# Patient Record
Sex: MALE | Race: WHITE | NOT HISPANIC OR LATINO | URBAN - METROPOLITAN AREA
[De-identification: names, ages, dates, MRNs, and addresses within clinical notes are randomized per-mention and may not be internally consistent; named-entity substitution may affect disease eponyms.]

---

## 2019-01-08 ENCOUNTER — INPATIENT (INPATIENT)
Facility: HOSPITAL | Age: 59
LOS: 1 days | Discharge: HOME | End: 2019-01-10
Attending: INTERNAL MEDICINE | Admitting: INTERNAL MEDICINE

## 2019-01-08 VITALS
OXYGEN SATURATION: 97 % | SYSTOLIC BLOOD PRESSURE: 232 MMHG | TEMPERATURE: 97 F | RESPIRATION RATE: 18 BRPM | DIASTOLIC BLOOD PRESSURE: 122 MMHG | HEART RATE: 96 BPM

## 2019-01-08 LAB
ALBUMIN SERPL ELPH-MCNC: 4.6 G/DL — SIGNIFICANT CHANGE UP (ref 3.5–5.2)
ALP SERPL-CCNC: 62 U/L — SIGNIFICANT CHANGE UP (ref 30–115)
ALT FLD-CCNC: 39 U/L — SIGNIFICANT CHANGE UP (ref 0–41)
ANION GAP SERPL CALC-SCNC: 15 MMOL/L — HIGH (ref 7–14)
AST SERPL-CCNC: 71 U/L — HIGH (ref 0–41)
BASOPHILS # BLD AUTO: 0.08 K/UL — SIGNIFICANT CHANGE UP (ref 0–0.2)
BASOPHILS NFR BLD AUTO: 0.8 % — SIGNIFICANT CHANGE UP (ref 0–1)
BILIRUB SERPL-MCNC: 0.3 MG/DL — SIGNIFICANT CHANGE UP (ref 0.2–1.2)
BUN SERPL-MCNC: 19 MG/DL — SIGNIFICANT CHANGE UP (ref 10–20)
CALCIUM SERPL-MCNC: 9.6 MG/DL — SIGNIFICANT CHANGE UP (ref 8.5–10.1)
CHLORIDE SERPL-SCNC: 100 MMOL/L — SIGNIFICANT CHANGE UP (ref 98–110)
CO2 SERPL-SCNC: 30 MMOL/L — SIGNIFICANT CHANGE UP (ref 17–32)
CREAT SERPL-MCNC: 1.1 MG/DL — SIGNIFICANT CHANGE UP (ref 0.7–1.5)
EOSINOPHIL # BLD AUTO: 0.59 K/UL — SIGNIFICANT CHANGE UP (ref 0–0.7)
EOSINOPHIL NFR BLD AUTO: 6.2 % — SIGNIFICANT CHANGE UP (ref 0–8)
GLUCOSE SERPL-MCNC: 86 MG/DL — SIGNIFICANT CHANGE UP (ref 70–99)
HCT VFR BLD CALC: 44 % — SIGNIFICANT CHANGE UP (ref 42–52)
HGB BLD-MCNC: 14.9 G/DL — SIGNIFICANT CHANGE UP (ref 14–18)
IMM GRANULOCYTES NFR BLD AUTO: 0.2 % — SIGNIFICANT CHANGE UP (ref 0.1–0.3)
LYMPHOCYTES # BLD AUTO: 3.3 K/UL — SIGNIFICANT CHANGE UP (ref 1.2–3.4)
LYMPHOCYTES # BLD AUTO: 34.6 % — SIGNIFICANT CHANGE UP (ref 20.5–51.1)
MCHC RBC-ENTMCNC: 29.7 PG — SIGNIFICANT CHANGE UP (ref 27–31)
MCHC RBC-ENTMCNC: 33.9 G/DL — SIGNIFICANT CHANGE UP (ref 32–37)
MCV RBC AUTO: 87.6 FL — SIGNIFICANT CHANGE UP (ref 80–94)
MONOCYTES # BLD AUTO: 0.82 K/UL — HIGH (ref 0.1–0.6)
MONOCYTES NFR BLD AUTO: 8.6 % — SIGNIFICANT CHANGE UP (ref 1.7–9.3)
NEUTROPHILS # BLD AUTO: 4.72 K/UL — SIGNIFICANT CHANGE UP (ref 1.4–6.5)
NEUTROPHILS NFR BLD AUTO: 49.6 % — SIGNIFICANT CHANGE UP (ref 42.2–75.2)
NRBC # BLD: 0 /100 WBCS — SIGNIFICANT CHANGE UP (ref 0–0)
PLATELET # BLD AUTO: 243 K/UL — SIGNIFICANT CHANGE UP (ref 130–400)
POTASSIUM SERPL-MCNC: 4.3 MMOL/L — SIGNIFICANT CHANGE UP (ref 3.5–5)
POTASSIUM SERPL-SCNC: 4.3 MMOL/L — SIGNIFICANT CHANGE UP (ref 3.5–5)
PROT SERPL-MCNC: 7.2 G/DL — SIGNIFICANT CHANGE UP (ref 6–8)
RBC # BLD: 5.02 M/UL — SIGNIFICANT CHANGE UP (ref 4.7–6.1)
RBC # FLD: 12.7 % — SIGNIFICANT CHANGE UP (ref 11.5–14.5)
SODIUM SERPL-SCNC: 145 MMOL/L — SIGNIFICANT CHANGE UP (ref 135–146)
TROPONIN T SERPL-MCNC: <0.01 NG/ML — SIGNIFICANT CHANGE UP
WBC # BLD: 9.53 K/UL — SIGNIFICANT CHANGE UP (ref 4.8–10.8)
WBC # FLD AUTO: 9.53 K/UL — SIGNIFICANT CHANGE UP (ref 4.8–10.8)

## 2019-01-08 RX ORDER — METOPROLOL TARTRATE 50 MG
25 TABLET ORAL
Qty: 0 | Refills: 0 | Status: DISCONTINUED | OUTPATIENT
Start: 2019-01-08 | End: 2019-01-10

## 2019-01-08 RX ORDER — PANTOPRAZOLE SODIUM 20 MG/1
40 TABLET, DELAYED RELEASE ORAL
Qty: 0 | Refills: 0 | Status: DISCONTINUED | OUTPATIENT
Start: 2019-01-08 | End: 2019-01-10

## 2019-01-08 RX ORDER — CHLORHEXIDINE GLUCONATE 213 G/1000ML
1 SOLUTION TOPICAL
Qty: 0 | Refills: 0 | Status: DISCONTINUED | OUTPATIENT
Start: 2019-01-08 | End: 2019-01-10

## 2019-01-08 RX ORDER — HEPARIN SODIUM 5000 [USP'U]/ML
5000 INJECTION INTRAVENOUS; SUBCUTANEOUS EVERY 8 HOURS
Qty: 0 | Refills: 0 | Status: DISCONTINUED | OUTPATIENT
Start: 2019-01-08 | End: 2019-01-09

## 2019-01-08 RX ORDER — LABETALOL HCL 100 MG
10 TABLET ORAL ONCE
Qty: 0 | Refills: 0 | Status: COMPLETED | OUTPATIENT
Start: 2019-01-08 | End: 2019-01-08

## 2019-01-08 RX ORDER — MECLIZINE HCL 12.5 MG
25 TABLET ORAL ONCE
Qty: 0 | Refills: 0 | Status: COMPLETED | OUTPATIENT
Start: 2019-01-08 | End: 2019-01-08

## 2019-01-08 RX ORDER — LABETALOL HCL 100 MG
20 TABLET ORAL ONCE
Qty: 0 | Refills: 0 | Status: COMPLETED | OUTPATIENT
Start: 2019-01-08 | End: 2019-01-08

## 2019-01-08 RX ORDER — SODIUM CHLORIDE 9 MG/ML
1000 INJECTION INTRAMUSCULAR; INTRAVENOUS; SUBCUTANEOUS ONCE
Qty: 0 | Refills: 0 | Status: COMPLETED | OUTPATIENT
Start: 2019-01-08 | End: 2019-01-08

## 2019-01-08 RX ORDER — AMLODIPINE BESYLATE 2.5 MG/1
5 TABLET ORAL ONCE
Qty: 0 | Refills: 0 | Status: COMPLETED | OUTPATIENT
Start: 2019-01-08 | End: 2019-01-08

## 2019-01-08 RX ORDER — AMLODIPINE BESYLATE 2.5 MG/1
5 TABLET ORAL DAILY
Qty: 0 | Refills: 0 | Status: DISCONTINUED | OUTPATIENT
Start: 2019-01-08 | End: 2019-01-09

## 2019-01-08 RX ORDER — ASPIRIN/CALCIUM CARB/MAGNESIUM 324 MG
81 TABLET ORAL DAILY
Qty: 0 | Refills: 0 | Status: DISCONTINUED | OUTPATIENT
Start: 2019-01-08 | End: 2019-01-10

## 2019-01-08 RX ORDER — ATORVASTATIN CALCIUM 80 MG/1
80 TABLET, FILM COATED ORAL AT BEDTIME
Qty: 0 | Refills: 0 | Status: DISCONTINUED | OUTPATIENT
Start: 2019-01-08 | End: 2019-01-10

## 2019-01-08 RX ADMIN — Medication 10 MILLIGRAM(S): at 18:25

## 2019-01-08 RX ADMIN — AMLODIPINE BESYLATE 5 MILLIGRAM(S): 2.5 TABLET ORAL at 15:56

## 2019-01-08 RX ADMIN — ATORVASTATIN CALCIUM 80 MILLIGRAM(S): 80 TABLET, FILM COATED ORAL at 23:22

## 2019-01-08 RX ADMIN — Medication 25 MILLIGRAM(S): at 15:56

## 2019-01-08 RX ADMIN — Medication 20 MILLIGRAM(S): at 22:28

## 2019-01-08 RX ADMIN — HEPARIN SODIUM 5000 UNIT(S): 5000 INJECTION INTRAVENOUS; SUBCUTANEOUS at 23:23

## 2019-01-08 RX ADMIN — SODIUM CHLORIDE 2000 MILLILITER(S): 9 INJECTION INTRAMUSCULAR; INTRAVENOUS; SUBCUTANEOUS at 15:09

## 2019-01-08 NOTE — ED PROVIDER NOTE - NS ED ROS FT
Except as documented in HPI, all other ROS negative.   GENERAL: Denies fever/chills, loss of appetite/weight or fatigue.  SKIN: Denies rashes, abrasions, lacerations, ecchymosis, erythema, or edema.  HEAD: + dizziness.  EYES: Denies blurry vision, diplopia, or photophobia.  ENT: Denies earaches, discharge or hearing loss. Denies nasal discharge or epistaxis. Denies sore throat.   CARDIAC: Denies chest pain, palpitations, or SOB.   RESPIRATORY: Denies SOB, cough, hemoptysis or wheezing.   GI: Denies abdominal pain, n/v/d.   MSK: Denies myalgias, bony deformity or pain.   NEURO: Denies paresthesias, tingling, weakness.

## 2019-01-08 NOTE — H&P ADULT - NSHPPHYSICALEXAM_GEN_ALL_CORE
PHYSICAL EXAM:  GEN: No acute distress  HEENT: NCAT. Left-kenny saccadic motion of the eyes, EOMI/PERRLA  LUNGS: Clear to auscultation bilaterally   HEART: S1/S2 present. RRR.   ABD: Soft, non-tender, non-distended. Bowel sounds present  EXT: No pitting edema. 5/5 muscle strength in all extremities   NEURO: AAOX3

## 2019-01-08 NOTE — ED ADULT NURSE NOTE - CHPI ED NUR SYMPTOMS NEG
no change in level of consciousness/no blurred vision/no vomiting/no loss of consciousness/no nausea/no numbness/no confusion

## 2019-01-08 NOTE — ED PROVIDER NOTE - PROGRESS NOTE DETAILS
Initial BP noted - pt aware and states he does not know if he has high BP as he has not been to a doctor and has not had his BP checked and does not take meds. Will get labs and ct head and recheck BP. Spoke w/ neuro NP - recommending admission to medicine, with neuro consult & f/u, likely to get MRI. States she is leaving now at 7pm, now overnight coverage, recommending putting page through system for neuro attending. BP still elevated, will give another dose of Labetalol and admit. Spoke w/ Dr. Krishnan from neuro. Recommending admission, okay with tele, for MRI's and neuro eval. Recommending keeping systolic around 180's, as to not drop It too fast. BP was initially still >200/100 after 10 of Labetalol, but came down to 188/88 before giving the 20 of Labetalol. Will hold the 20 of Labetalol for now. Spoke to MAR, aware of pt for admission.

## 2019-01-08 NOTE — H&P ADULT - HISTORY OF PRESENT ILLNESS
57 yo M with PMHx of Meniere's disease (L ear, diagnosed 20 years ago) with residual L-sided hearing loss and constant tinnitus, presents with acute onset of vertigo, nausea/vomiting x 3 days. Symptoms began on Saturday 7 pm, while he was watching a football game, had drank 3 glasses of wine at the time. He felt everything began to spin in large motions, improved with eye closure, associated with intense nausea, dry heaves and multiple episodes of vomiting,, sweating, ringing in the ear. He was able to go to sleep. The following day, the nausea had improved slightly but he remained unsteady of his feet and he was able to tolerate small meals. He went to Urgent Care on Monday where they noted he had high blood pressure and he was sent to the hospital. He denies chest pain, palpitations, blurry vision, paresthesia/weakness. On admission he was /122, HR 90s. He was given Amlodipine 5 mg, Labetalol IV 10 mg x 1. NS 1 L x 1.     He admits he is a binge-drinker. Drinks approximately 3-5 glasses of wine per occasion, 4-5 times a month. Never smoked. He has taken Amoxicillin prescribed before by physicians. Penicillin allergy was noted as an infant. He has no recollection.     His father has passed away at 38 yo from MI. He had uncontrolled hypertension. His mom passed away at 69 from metastatic peritoneal mesothelioma. Father's family has long QT syndrome.

## 2019-01-08 NOTE — H&P ADULT - FAMILY HISTORY
Father  Still living? No  Family history of long QT syndrome, Age at diagnosis: Age Unknown  Family history of heart attack, Age at diagnosis: Age Unknown     Mother  Still living? Unknown  Family history of cancer, Age at diagnosis: Age Unknown

## 2019-01-08 NOTE — ED PROVIDER NOTE - OBJECTIVE STATEMENT
Pt is a 57 y/o Male, unknown PMHX as pt has not been to a doctor in years, presents to ED w/ dizziness and vomiting. Pt reports symptoms began 3 days ago, he vomited several times at start of symptoms, but has not had vomiting since. Also reports having blurry vision at start of symptoms. Pt reports dizziness has gradually been getting better, but he still does not feel 100% and feels worse with ambulating. Pt denies nausea at this time. Denies fever, chills, headache, vision changes, chest pain, SOB, abdominal pain, weakness, numbness, tingling, falls. Denies earaches.

## 2019-01-08 NOTE — ED ADULT NURSE NOTE - OBJECTIVE STATEMENT
Pt a&ox3, pt p/w c/o feeling off balance. Pt states symptoms began Saturday, pt felt as if room was spinning like when he had vertigo in the past, pt also had episode of n/v and fever/chills. Pt states he feels better since Saturday just off balance but n/v, fever/chills resolved. Pt went to  yesterday and B/P was high, pt does not see a cardiologist, B/P high upon arrival. Pt denies CP/SOB or palpitations, denies n/v/d, denies changes in vision, denies HA, denies abd pain.

## 2019-01-08 NOTE — ED PROVIDER NOTE - PHYSICAL EXAMINATION
VITAL SIGNS: I have reviewed the initial vital signs.   CONSTITUTIONAL: Awake, alert. Well-developed; well-nourished; in no distress. Non-toxic appearing.   SKIN: No rash, vesicles/lesion, abrasions or lacerations. No ecchymosis or signs of trauma.   HEAD: Normocephalic; atraumatic.   EYES: Symmetrical, no discharge or signs of trauma. Conjunctiva and sclera clear. PERRL, EOM intact with no nystagmus or pain.   ENT: Airway patent. MMM.   NECK: Supple; non-tender.  CARD: No chest wall deformity or tenderness. S1, S2 normal; no murmurs, gallops, or rubs. Regular rate and rhythm.  RESP: Good air movement. Lungs CTAB. No crackles, wheezes, rales or rhonchi.  ABD: Soft; non-distended; non-tender.   EXT: No bony deformity or tenderness. Normal ROM x 4 extremities.   NEURO: A&Ox3. GCS 15. Normal speech. CN 2-12 intact. Strength 5/5 UE/LE b/l. No sensory deficits. n/v intact UE/LE b/l, pulses symmetrical. Normal finger to nose exam. Negative Romberg. + slightly wobbly gait. VITAL SIGNS: I have reviewed the initial vital signs.   CONSTITUTIONAL: Awake, alert. Well-developed; well-nourished; in no distress. Non-toxic appearing.   SKIN: No rash, vesicles/lesion, abrasions or lacerations. No ecchymosis or signs of trauma.   HEAD: Normocephalic; atraumatic.   EYES: Symmetrical, no discharge or signs of trauma. Conjunctiva and sclera clear. PERRL, EOM intact with nystagmus to left.   ENT: Airway patent. MMM.   NECK: Supple; non-tender.  CARD: No chest wall deformity or tenderness. S1, S2 normal; no murmurs, gallops, or rubs. Regular rate and rhythm.  RESP: Good air movement. Lungs CTAB. No crackles, wheezes, rales or rhonchi.  ABD: Soft; non-distended; non-tender.   EXT: No bony deformity or tenderness. Normal ROM x 4 extremities.   NEURO: A&Ox3. GCS 15. Normal speech. CN 2-12 intact. Strength 5/5 UE/LE b/l. No sensory deficits. n/v intact UE/LE b/l, pulses symmetrical. Normal finger to nose exam. Negative Romberg. + slightly wobbly gait.

## 2019-01-08 NOTE — ED ADULT NURSE NOTE - INTERVENTIONS DEFINITIONS
Call bell, personal items and telephone within reach/Monitor gait and stability/Physically safe environment: no spills, clutter or unnecessary equipment/[place] to call system/Instruct patient to call for assistance/Stretcher in lowest position, wheels locked, appropriate side rails in place/Monitor for mental status changes and reorient to person, place, and time/Reinforce activity limits and safety measures with patient and family

## 2019-01-08 NOTE — H&P ADULT - NSHPLABSRESULTS_GEN_ALL_CORE
LABS:                        14.9   9.53  )-----------( 243      ( 08 Jan 2019 15:00 )             44.0     01-08    145  |  100  |  19  ----------------------------<  86  4.3   |  30  |  1.1    Ca    9.6      08 Jan 2019 15:00    TPro  7.2  /  Alb  4.6  /  TBili  0.3  /  DBili  x   /  AST  71<H>  /  ALT  39  /  AlkPhos  62  01-08          Troponin T, Serum: <0.01 ng/mL (01-08-19 @ 15:00)      CARDIAC MARKERS ( 08 Jan 2019 15:00 )  x     / <0.01 ng/mL / x     / x     / x          < from: CT Head No Cont (01.08.19 @ 17:02) >    NoCT evidence for acute intracranial pathology.    < end of copied text >

## 2019-01-08 NOTE — ED ADULT NURSE REASSESSMENT NOTE - NS ED NURSE REASSESS COMMENT FT1
Handoff report given by previous nurse. Patient assessed aox4, hypertensive asymptomatic. Pt denies; chest pain, headache, diaphoresis, dizziness at this time. Pt bp 188/89 lizzette Ortez notified, hold labetalol 20 mg dose as per neurology recommendations not to drop BP too fast. Systolic bp goal 180. Pt placed on cardiac monitor hr  79 in NSR, plan of care reviewed with patient.

## 2019-01-08 NOTE — H&P ADULT - ASSESSMENT
59 yo M with PMHx of Meniere's disease (L ear, diagnosed 20 years ago) with residual L-sided hearing loss and constant tinnitus, presents with acute onset of vertigo, nausea/vomiting x 3 days.     #) Vertigo, leftward saccadic motion of the eyes, possibly 2/2 hypertensive emergency, hx of Meniere's disease   - Follow-up Neurology  - Lower BP to 160-180 mmHg in the first 24 hrs  - Maintain on Telemetry for 24 hrs  - Neurochecks Q4H  - Await MRI Head     #) Prolonged QT, family history of prolonged QT syndrome  - Follow-up 2D Echo  - Follow-up Mg    #) Healthcare Maintenance  - Follow-up A1c, Lipid, TSH    #) GI ppx: PO Protonix 40 mg QD  #) DVT ppx: HSQ    #) Activity: Ambulate as tolerated, fall risk  #) Diet: Low sodium    #) Dispo: Home  #) Full Code 59 yo M with PMHx of Meniere's disease (L ear, diagnosed 20 years ago) with residual L-sided hearing loss and constant tinnitus, presents with acute onset of vertigo, nausea/vomiting x 3 days.     #) Acute onset vertigo, leftward saccadic motion of the eyes, possibly 2/2 hypertensive emergency, hx of Meniere's disease   - Follow-up Neurology  - Lower BP to 160-180 mmHg in the first 24 hrs  - Maintain on Telemetry for 24 hrs  - Neurochecks Q4H  - Await MRI Head   - Start on Amlodipine 5 mg, Metoprolol 25 mg BID  - Aspirin 81 mg, Atorvastatin 80 mg QHS    #) Prolonged QT, family history of prolonged QT syndrome  - Follow-up 2D Echo  - Follow-up Mg    #) Healthcare Maintenance  - Follow-up A1c, Lipid, TSH    #) GI ppx: PO Protonix 40 mg QD  #) DVT ppx: HSQ    #) Activity: Ambulate as tolerated, fall risk  #) Diet: Low sodium    #) Dispo: Home  #) Full Code

## 2019-01-08 NOTE — ED PROVIDER NOTE - MEDICAL DECISION MAKING DETAILS
57 yo male with dizziness and elevated blood pressure,  CT head negative, labs WNL, ECG without ischemic changes.  Louie consulted admit for BP control and further work up.

## 2019-01-08 NOTE — ED PROVIDER NOTE - ATTENDING CONTRIBUTION TO CARE
57 yo male h/o left ear hearing loss for decades c/o intermittent positions dizziness described as a spinning sensation for a few days.  Abrupt onset after turning his head associated with intense nausea and a few episodes of NBNB emesis.  Symptoms have improved now, but mild dizziness persists, seen in urgent care center yesterday, had elevated BP , has not seen doctor in years since evaluation of hearing loss.  Denies headache, neck pain, blurry or double vision, no 57 yo male h/o left ear hearing loss for decades c/o intermittent positions dizziness described as a spinning sensation for a few days.  Abrupt onset after turning his head associated with intense nausea and a few episodes of NBNB emesis.  Symptoms have improved now, but mild dizziness persists, seen in urgent care center yesterday, had elevated BP , has not seen doctor in years since evaluation of hearing loss.  Denies headache, neck pain, blurry or double vision, no CP, SOB, leg pain or swelling, no abdominal or flank pain.  Exam as noted.  Will check labs, Ct head, , Meclizine, reassess.

## 2019-01-08 NOTE — H&P ADULT - ATTENDING COMMENTS
Patient seen and examined independently. Agree with resident note/ history / physical exam and plan of care with following exceptions/additions/updates. Case discussed with house-staff, nursing and patient/pt decision maker. spoke with wife at the bedside.     At this point he has slight headache. no n/v. no visual disturbance.   has chronic [place] on the left.     Vital Signs Last 24 Hrs  T(C): 37.1 (09 Jan 2019 07:21), Max: 37.1 (09 Jan 2019 07:21)  T(F): 98.7 (09 Jan 2019 07:21), Max: 98.7 (09 Jan 2019 07:21)  HR: 73 (09 Jan 2019 07:21) (70 - 96)  BP: 183/88 (09 Jan 2019 10:42) (167/77 - 232/122)  RR: 20 (09 Jan 2019 07:21) (18 - 20)  SpO2: 99% (09 Jan 2019 07:21) (96% - 100%)    Physical exam:   constitutional NAD, AAOX3, Respiratory  lungs CTA, CVS heart RRR, GI: abdomen Soft NT, ND, BS+, skin: intact  neuro exam non focal.                           15.0   9.72  )-----------( 193      ( 09 Jan 2019 08:00 )             44.6     01-09    144  |  101  |  17  ----------------------------<  107<H>  3.8   |  27  |  0.9    Ca    9.1      09 Jan 2019 08:00  Mg     2.0     01-09    TPro  6.9  /  Alb  4.2  /  TBili  0.3  /  DBili  <0.2  /  AST  86<H>  /  ALT  44<H>  /  AlkPhos  61  01-09    < from: CT Head No Cont (01.08.19 @ 17:02) >    NoCT evidence for acute intracranial pathology.    < from: 12 Lead ECG (01.08.19 @ 14:32) >    QTC Calculation(Bezet) 506 ms    Normal sinus rhythm  Right bundle branch block  Abnormal ECG    < end of copied text >    a/p      1- Hypertension , uncontrolled. hydralazine IV given, increase amlodipine to 10, add HCTZ, cont metoprolol.     2- prolonged QT, check Mg, keep level around 2. EP eval    3- dizziness, hx of meniere disease and hearing loss ( left ear) outpt fu.     4- dvt prophylaxis    Full code.

## 2019-01-09 DIAGNOSIS — I45.81 LONG QT SYNDROME: ICD-10-CM

## 2019-01-09 DIAGNOSIS — R42 DIZZINESS AND GIDDINESS: ICD-10-CM

## 2019-01-09 DIAGNOSIS — I10 ESSENTIAL (PRIMARY) HYPERTENSION: ICD-10-CM

## 2019-01-09 LAB
ALBUMIN SERPL ELPH-MCNC: 4.2 G/DL — SIGNIFICANT CHANGE UP (ref 3.5–5.2)
ALP SERPL-CCNC: 61 U/L — SIGNIFICANT CHANGE UP (ref 30–115)
ALT FLD-CCNC: 44 U/L — HIGH (ref 0–41)
ANION GAP SERPL CALC-SCNC: 16 MMOL/L — HIGH (ref 7–14)
APTT BLD: 31.6 SEC — SIGNIFICANT CHANGE UP (ref 27–39.2)
AST SERPL-CCNC: 86 U/L — HIGH (ref 0–41)
BASOPHILS # BLD AUTO: 0.09 K/UL — SIGNIFICANT CHANGE UP (ref 0–0.2)
BASOPHILS NFR BLD AUTO: 0.9 % — SIGNIFICANT CHANGE UP (ref 0–1)
BILIRUB DIRECT SERPL-MCNC: <0.2 MG/DL — SIGNIFICANT CHANGE UP (ref 0–0.2)
BILIRUB INDIRECT FLD-MCNC: >0.1 MG/DL — LOW (ref 0.2–1.2)
BILIRUB SERPL-MCNC: 0.3 MG/DL — SIGNIFICANT CHANGE UP (ref 0.2–1.2)
BUN SERPL-MCNC: 17 MG/DL — SIGNIFICANT CHANGE UP (ref 10–20)
CALCIUM SERPL-MCNC: 9.1 MG/DL — SIGNIFICANT CHANGE UP (ref 8.5–10.1)
CHLORIDE SERPL-SCNC: 101 MMOL/L — SIGNIFICANT CHANGE UP (ref 98–110)
CHOLEST SERPL-MCNC: 147 MG/DL — SIGNIFICANT CHANGE UP (ref 100–200)
CO2 SERPL-SCNC: 27 MMOL/L — SIGNIFICANT CHANGE UP (ref 17–32)
CREAT SERPL-MCNC: 0.9 MG/DL — SIGNIFICANT CHANGE UP (ref 0.7–1.5)
EOSINOPHIL # BLD AUTO: 0.56 K/UL — SIGNIFICANT CHANGE UP (ref 0–0.7)
EOSINOPHIL NFR BLD AUTO: 5.8 % — SIGNIFICANT CHANGE UP (ref 0–8)
ESTIMATED AVERAGE GLUCOSE: 123 MG/DL — HIGH (ref 68–114)
GLUCOSE SERPL-MCNC: 107 MG/DL — HIGH (ref 70–99)
HBA1C BLD-MCNC: 5.9 % — HIGH (ref 4–5.6)
HCT VFR BLD CALC: 44.6 % — SIGNIFICANT CHANGE UP (ref 42–52)
HDLC SERPL-MCNC: 47 MG/DL — SIGNIFICANT CHANGE UP
HGB BLD-MCNC: 15 G/DL — SIGNIFICANT CHANGE UP (ref 14–18)
IMM GRANULOCYTES NFR BLD AUTO: 0.2 % — SIGNIFICANT CHANGE UP (ref 0.1–0.3)
INR BLD: 1.04 RATIO — SIGNIFICANT CHANGE UP (ref 0.65–1.3)
LIPID PNL WITH DIRECT LDL SERPL: 90 MG/DL — SIGNIFICANT CHANGE UP (ref 4–129)
LYMPHOCYTES # BLD AUTO: 2.74 K/UL — SIGNIFICANT CHANGE UP (ref 1.2–3.4)
LYMPHOCYTES # BLD AUTO: 28.2 % — SIGNIFICANT CHANGE UP (ref 20.5–51.1)
MAGNESIUM SERPL-MCNC: 2 MG/DL — SIGNIFICANT CHANGE UP (ref 1.8–2.4)
MCHC RBC-ENTMCNC: 29.4 PG — SIGNIFICANT CHANGE UP (ref 27–31)
MCHC RBC-ENTMCNC: 33.6 G/DL — SIGNIFICANT CHANGE UP (ref 32–37)
MCV RBC AUTO: 87.3 FL — SIGNIFICANT CHANGE UP (ref 80–94)
MONOCYTES # BLD AUTO: 0.67 K/UL — HIGH (ref 0.1–0.6)
MONOCYTES NFR BLD AUTO: 6.9 % — SIGNIFICANT CHANGE UP (ref 1.7–9.3)
NEUTROPHILS # BLD AUTO: 5.64 K/UL — SIGNIFICANT CHANGE UP (ref 1.4–6.5)
NEUTROPHILS NFR BLD AUTO: 58 % — SIGNIFICANT CHANGE UP (ref 42.2–75.2)
NRBC # BLD: 0 /100 WBCS — SIGNIFICANT CHANGE UP (ref 0–0)
PLATELET # BLD AUTO: 193 K/UL — SIGNIFICANT CHANGE UP (ref 130–400)
POTASSIUM SERPL-MCNC: 3.8 MMOL/L — SIGNIFICANT CHANGE UP (ref 3.5–5)
POTASSIUM SERPL-SCNC: 3.8 MMOL/L — SIGNIFICANT CHANGE UP (ref 3.5–5)
PROT SERPL-MCNC: 6.9 G/DL — SIGNIFICANT CHANGE UP (ref 6–8)
PROTHROM AB SERPL-ACNC: 12 SEC — SIGNIFICANT CHANGE UP (ref 9.95–12.87)
RBC # BLD: 5.11 M/UL — SIGNIFICANT CHANGE UP (ref 4.7–6.1)
RBC # FLD: 12.6 % — SIGNIFICANT CHANGE UP (ref 11.5–14.5)
SODIUM SERPL-SCNC: 144 MMOL/L — SIGNIFICANT CHANGE UP (ref 135–146)
TOTAL CHOLESTEROL/HDL RATIO MEASUREMENT: 3.1 RATIO — LOW (ref 4–5.5)
TRIGL SERPL-MCNC: 123 MG/DL — SIGNIFICANT CHANGE UP (ref 10–149)
WBC # BLD: 9.72 K/UL — SIGNIFICANT CHANGE UP (ref 4.8–10.8)
WBC # FLD AUTO: 9.72 K/UL — SIGNIFICANT CHANGE UP (ref 4.8–10.8)

## 2019-01-09 RX ORDER — ACETAMINOPHEN 500 MG
650 TABLET ORAL EVERY 6 HOURS
Qty: 0 | Refills: 0 | Status: DISCONTINUED | OUTPATIENT
Start: 2019-01-09 | End: 2019-01-10

## 2019-01-09 RX ORDER — AMLODIPINE BESYLATE 2.5 MG/1
5 TABLET ORAL ONCE
Qty: 0 | Refills: 0 | Status: COMPLETED | OUTPATIENT
Start: 2019-01-09 | End: 2019-01-09

## 2019-01-09 RX ORDER — HYDRALAZINE HCL 50 MG
10 TABLET ORAL ONCE
Qty: 0 | Refills: 0 | Status: COMPLETED | OUTPATIENT
Start: 2019-01-09 | End: 2019-01-09

## 2019-01-09 RX ORDER — HYDROCHLOROTHIAZIDE 25 MG
25 TABLET ORAL DAILY
Qty: 0 | Refills: 0 | Status: DISCONTINUED | OUTPATIENT
Start: 2019-01-09 | End: 2019-01-10

## 2019-01-09 RX ORDER — ONDANSETRON 8 MG/1
4 TABLET, FILM COATED ORAL ONCE
Qty: 0 | Refills: 0 | Status: DISCONTINUED | OUTPATIENT
Start: 2019-01-09 | End: 2019-01-09

## 2019-01-09 RX ORDER — AMLODIPINE BESYLATE 2.5 MG/1
10 TABLET ORAL DAILY
Qty: 0 | Refills: 0 | Status: DISCONTINUED | OUTPATIENT
Start: 2019-01-10 | End: 2019-01-10

## 2019-01-09 RX ORDER — ENOXAPARIN SODIUM 100 MG/ML
40 INJECTION SUBCUTANEOUS EVERY 24 HOURS
Qty: 0 | Refills: 0 | Status: DISCONTINUED | OUTPATIENT
Start: 2019-01-09 | End: 2019-01-10

## 2019-01-09 RX ADMIN — AMLODIPINE BESYLATE 5 MILLIGRAM(S): 2.5 TABLET ORAL at 12:48

## 2019-01-09 RX ADMIN — AMLODIPINE BESYLATE 5 MILLIGRAM(S): 2.5 TABLET ORAL at 05:16

## 2019-01-09 RX ADMIN — Medication 81 MILLIGRAM(S): at 11:34

## 2019-01-09 RX ADMIN — PANTOPRAZOLE SODIUM 40 MILLIGRAM(S): 20 TABLET, DELAYED RELEASE ORAL at 08:57

## 2019-01-09 RX ADMIN — Medication 650 MILLIGRAM(S): at 11:34

## 2019-01-09 RX ADMIN — Medication 10 MILLIGRAM(S): at 16:19

## 2019-01-09 RX ADMIN — Medication 10 MILLIGRAM(S): at 10:43

## 2019-01-09 RX ADMIN — ENOXAPARIN SODIUM 40 MILLIGRAM(S): 100 INJECTION SUBCUTANEOUS at 11:35

## 2019-01-09 RX ADMIN — Medication 650 MILLIGRAM(S): at 08:19

## 2019-01-09 RX ADMIN — Medication 650 MILLIGRAM(S): at 06:00

## 2019-01-09 RX ADMIN — CHLORHEXIDINE GLUCONATE 1 APPLICATION(S): 213 SOLUTION TOPICAL at 07:41

## 2019-01-09 RX ADMIN — Medication 25 MILLIGRAM(S): at 05:16

## 2019-01-09 RX ADMIN — HEPARIN SODIUM 5000 UNIT(S): 5000 INJECTION INTRAVENOUS; SUBCUTANEOUS at 05:15

## 2019-01-09 RX ADMIN — Medication 25 MILLIGRAM(S): at 18:09

## 2019-01-09 RX ADMIN — Medication 10 MILLIGRAM(S): at 06:46

## 2019-01-09 RX ADMIN — Medication 650 MILLIGRAM(S): at 12:48

## 2019-01-09 RX ADMIN — Medication 25 MILLIGRAM(S): at 12:48

## 2019-01-09 NOTE — CONSULT NOTE ADULT - SUBJECTIVE AND OBJECTIVE BOX
Patient is a 58y old  Male who presents with a chief complaint of Acute onset vertigo and nausea/vomiting x 3 days (09 Jan 2019 13:55)      HPI:  57 yo M with PMHx of Meniere's disease (L ear, diagnosed 20 years ago) with residual L-sided hearing loss and constant tinnitus, presents with acute onset of vertigo, nausea/vomiting x 3 days. Symptoms began on Saturday 7 pm, while he was watching a football game, had drank 3 glasses of wine at the time. He felt everything began to spin in large motions, improved with eye closure, associated with intense nausea, dry heaves and multiple episodes of vomiting,, sweating, ringing in the ear. He was able to go to sleep. The following day, the nausea had improved slightly but he remained unsteady of his feet and he was able to tolerate small meals. He went to Urgent Care on Monday where they noted he had high blood pressure and he was sent to the hospital. He denies chest pain, palpitations, blurry vision, paresthesia/weakness. On admission he was /122, HR 90s. He was given Amlodipine 5 mg, Labetalol IV 10 mg x 1. NS 1 L x 1.     He admits he is a binge-drinker. Drinks approximately 3-5 glasses of wine per occasion, 4-5 times a month. Never smoked. He has taken Amoxicillin prescribed before by physicians. Penicillin allergy was noted as an infant. He has no recollection.     His father has passed away at 38 yo from MI. He had uncontrolled hypertension. His mom passed away at 69 from metastatic peritoneal mesothelioma. Father's family has long QT syndrome. (08 Jan 2019 21:11)      PAST MEDICAL & SURGICAL HISTORY:  Gout  Vertigo  No significant past surgical history      PREVIOUS DIAGNOSTIC TESTING:      ECHO  FINDINGS:    STRESS TEST  FINDINGS:    CATHETERIZATION  FINDINGS:    MEDICATIONS  (STANDING):  aspirin  chewable 81 milliGRAM(s) Oral daily  atorvastatin 80 milliGRAM(s) Oral at bedtime  chlorhexidine 4% Liquid 1 Application(s) Topical <User Schedule>  enoxaparin Injectable 40 milliGRAM(s) SubCutaneous every 24 hours  hydrochlorothiazide 25 milliGRAM(s) Oral daily  metoprolol tartrate 25 milliGRAM(s) Oral two times a day  pantoprazole    Tablet 40 milliGRAM(s) Oral before breakfast    MEDICATIONS  (PRN):  acetaminophen   Tablet .. 650 milliGRAM(s) Oral every 6 hours PRN Mild Pain (1 - 3)      FAMILY HISTORY:  Family history of cancer (Mother)  Family history of heart attack (Father)  Family history of long QT syndrome (Father)      SOCIAL HISTORY:  CIGARETTES:  ALCOHOL:  DRUGS:                      REVIEW OF SYSTEMS:  CONSTITUTIONAL: No distress, Looks stable  NECK: No pain or stiffnes  RESPIRATORY: No cough, wheezing, shortness of breath  CARDIOVASCULAR: No chest pain, SOB, palpitations, leg swelling  GASTROINTESTINAL: No abdominal or epigastric pain. No nausea, vomiting, or hematemesis;  No melena.  NEUROLOGICAL: No dizziness, headaches, memory loss, loss of strength  SKIN: No itching, burning, rashes, or lesions   ENDOCRINE: No heat or cold intolerance  MUSCULOSKELETAL: No joint pain, No  swelling; No muscle pain  PSYCHIATRIC: No depression, anxiety, mood swings, or difficulty sleeping  ALLERGY: No hives, itching, rash          Vital Signs Last 24 Hrs  T(C): 36.2 (09 Jan 2019 18:00), Max: 37.1 (09 Jan 2019 07:21)  T(F): 97.1 (09 Jan 2019 18:00), Max: 98.7 (09 Jan 2019 07:21)  HR: 91 (09 Jan 2019 18:00) (70 - 92)  BP: 188/93 (09 Jan 2019 18:00) (167/77 - 207/102)  BP(mean): --  RR: 20 (09 Jan 2019 18:00) (20 - 20)  SpO2: 98% (09 Jan 2019 15:39) (96% - 100%)                      PHYSICAL EXAM:  GENERAL: No distress, well developed  HEAD:  Atraumatic, Normocephalic  NECK: Supple, No JVD, No Bruit of either carotid arteries  NERVOUS SYSTEM:  Alert, Awake, Oriented to time, place, person; Normal memory and speech; Normal motor Strength 5/5 B/L upper and lower extremities  CHEST/LUNG: Normal air entry to lung base bilaterally; No wheeze, crackle, rales, rhonchi  HEART: Regular heart beat, S1, A2, P2, No S3, No S4, No gallop, No murmur  ABDOMEN: Soft, Non tender, Non distended; Bowel sounds present  EXTREMITIES:  2+ Peripheral Pulses, No clubbing, No edema  SKIN: No rashes or lesions    TELEMETRY:    ECG:    I&O's Detail      LABS:                        15.0   9.72  )-----------( 193      ( 09 Jan 2019 08:00 )             44.6     01-09    144  |  101  |  17  ----------------------------<  107<H>  3.8   |  27  |  0.9    Ca    9.1      09 Jan 2019 08:00  Mg     2.0     01-09    TPro  6.9  /  Alb  4.2  /  TBili  0.3  /  DBili  <0.2  /  AST  86<H>  /  ALT  44<H>  /  AlkPhos  61  01-09    CARDIAC MARKERS ( 08 Jan 2019 15:00 )  x     / <0.01 ng/mL / x     / x     / x          PT/INR - ( 09 Jan 2019 08:00 )   PT: 12.00 sec;   INR: 1.04 ratio         PTT - ( 09 Jan 2019 08:00 )  PTT:31.6 sec    I&O's Summary      RADIOLOGY & ADDITIONAL STUDIES: Patient is a 58y old  Male who presents with a chief complaint of Acute onset vertigo and nausea/vomiting x 3 days (09 Jan 2019 13:55)      HPI:  59 yo M with PMHx of Meniere's disease (L ear, diagnosed 20 years ago) with residual L-sided hearing loss and constant tinnitus, presents with acute onset of vertigo, nausea/vomiting x 3 days. Symptoms began on Saturday 7 pm, while he was watching a football game, had drank 3 glasses of wine at the time. He felt everything began to spin in large motions, improved with eye closure, associated with intense nausea, dry heaves and multiple episodes of vomiting,, sweating, ringing in the ear. He was able to go to sleep. The following day, the nausea had improved slightly but he remained unsteady of his feet and he was able to tolerate small meals. He went to Urgent Care on Monday where they noted he had high blood pressure and he was sent to the hospital. He denies chest pain, palpitations, blurry vision, paresthesia/weakness. On admission he was /122, HR 90s. He was given Amlodipine 5 mg, Labetalol IV 10 mg x 1. NS 1 L x 1.     He admits he is a binge-drinker. Drinks approximately 3-5 glasses of wine per occasion, 4-5 times a month. Never smoked. He has taken Amoxicillin prescribed before by physicians. Penicillin allergy was noted as an infant. He has no recollection.     His father has passed away at 38 yo from MI. He had uncontrolled hypertension. His mom passed away at 69 from metastatic peritoneal mesothelioma. Father's family has long QT syndrome. (08 Jan 2019 21:11(pt's Father's sisters children x2 with long qt syndrome )  pt denies any history of syncope.  pt has not seen a medical doctor since 1985.       PAST MEDICAL & SURGICAL HISTORY:  Gout  Vertigo  No significant past surgical history        MEDICATIONS  (STANDING):  aspirin  chewable 81 milliGRAM(s) Oral daily  atorvastatin 80 milliGRAM(s) Oral at bedtime  chlorhexidine 4% Liquid 1 Application(s) Topical <User Schedule>  enoxaparin Injectable 40 milliGRAM(s) SubCutaneous every 24 hours  hydrochlorothiazide 25 milliGRAM(s) Oral daily  metoprolol tartrate 25 milliGRAM(s) Oral two times a day  pantoprazole    Tablet 40 milliGRAM(s) Oral before breakfast    MEDICATIONS  (PRN):  acetaminophen   Tablet .. 650 milliGRAM(s) Oral every 6 hours PRN Mild Pain (1 - 3)      FAMILY HISTORY:  Family history of cancer (Mother)  Family history of heart attack (Father)  Family history of long QT syndrome (Father's side of the family)      SOCIAL HISTORY:  CIGARETTES:none  ALCOHOL: as above  DRUGS:none                      REVIEW OF SYSTEMS:  CONSTITUTIONAL: No distress, Looks stable  NECK: No pain   RESPIRATORY: No cough, wheezing, shortness of breath  CARDIOVASCULAR: No chest pain, SOB, palpitations, leg swelling  GASTROINTESTINAL: No abdominal or epigastric pain. No nausea, vomiting, or hematemesis;  No melena.  NEUROLOGICAL: No dizziness,(+) headaches, no memory loss, loss of strength  SKIN: No itching, burning, rashes, or lesions   ENDOCRINE: No heat or cold intolerance  MUSCULOSKELETAL: No joint pain, No  swelling; No muscle pain  PSYCHIATRIC: No depression, anxiety, mood swings, or difficulty sleeping  ALLERGY: No hives, itching, rash          Vital Signs Last 24 Hrs  T(C): 36.2 (09 Jan 2019 18:00), Max: 37.1 (09 Jan 2019 07:21)  T(F): 97.1 (09 Jan 2019 18:00), Max: 98.7 (09 Jan 2019 07:21)  HR: 91 (09 Jan 2019 18:00) (70 - 92)  BP: 188/93 (09 Jan 2019 18:00) (167/77 - 207/102)  BP(mean): --  RR: 20 (09 Jan 2019 18:00) (20 - 20)  SpO2: 98% (09 Jan 2019 15:39) (96% - 100%)                      PHYSICAL EXAM:  GENERAL: No distress, well developed  HEAD:  Atraumatic, Normocephalic  NECK: Supple, No JVD, No Bruit of either carotid arteries  NERVOUS SYSTEM:  Alert, Awake, Oriented to time, place, person; Normal memory and speech; Normal motor Strength 5/5 B/L upper and lower extremities  CHEST/LUNG: Normal air entry to lung base bilaterally; No wheeze, crackle, rales, rhonchi  HEART: Regular heart beat, S1, A2, P2, No S3, No S4, No gallop, No murmur  ABDOMEN: Soft, Non tender, Non distended; Bowel sounds present  EXTREMITIES:  2+ Peripheral Pulses, No clubbing, No edema  SKIN: No rashes or lesions    TELEMETRY: nsr    ECG:< from: 12 Lead ECG (01.08.19 @ 14:32) >  Diagnosis Line Normal sinus rhythm  Right bundle branch block  Abnormal ECG    < end of copied text >      I&O's Detail      LABS:                        15.0   9.72  )-----------( 193      ( 09 Jan 2019 08:00 )             44.6     01-09    144  |  101  |  17  ----------------------------<  107<H>  3.8   |  27  |  0.9    Ca    9.1      09 Jan 2019 08:00  Mg     2.0     01-09    TPro  6.9  /  Alb  4.2  /  TBili  0.3  /  DBili  <0.2  /  AST  86<H>  /  ALT  44<H>  /  AlkPhos  61  01-09    CARDIAC MARKERS ( 08 Jan 2019 15:00 )  x     / <0.01 ng/mL / x     / x     / x          PT/INR - ( 09 Jan 2019 08:00 )   PT: 12.00 sec;   INR: 1.04 ratio         PTT - ( 09 Jan 2019 08:00 )  PTT:31.6 sec    I&O's Summary      RADIOLOGY & ADDITIONAL STUDIES:

## 2019-01-09 NOTE — CONSULT NOTE ADULT - ATTENDING COMMENTS
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

## 2019-01-09 NOTE — CONSULT NOTE ADULT - REASON FOR ADMISSION
Acute onset vertigo and nausea/vomiting x 3 days

## 2019-01-09 NOTE — CONSULT NOTE ADULT - ASSESSMENT
A/P:  - pt reports Dizziness and left sided ear tinnitus with partial hearing loss  - denies documented history of Meniere's disease or out patient follow up with Neurology  - Recommend MR Head to rule out structural abnormalities / stroke    Attending Note to follow

## 2019-01-09 NOTE — CONSULT NOTE ADULT - PROBLEM SELECTOR RECOMMENDATION 2
as per ep  cont beta blocker  consider genetic testing'  avoid meds that prolong qtc as per ep  cont beta blocker  consider genetic testing'  f/u 2d echo  repeat ekg  avoid meds that prolong qtc

## 2019-01-09 NOTE — CONSULT NOTE ADULT - ASSESSMENT
Assessment: Pt is a 59 yo M with hx of Meniere's disease brought to ED for vertigo with dizziness, N/V x 3 days. Pt reports taking "6 pills" of dramamine at home with little relief. Sts he went to urgent care this morning and was told his BP was elevated and to go to the ED. Pt still reports feeling lightheaded but no vertiginous symptoms at this time. In the ED, pts BP was 232/122 and he was given amlodipine 5 mg and labetalol 10 mg as well as started on Metoprolol 25 mg. Pts BP now 171/79, reports no previous hx of HTN and pt takes no other medications besides the OTC dramamine at home. Pt reports +ETOH use and admits to drinking 3 glasses of wine when sx started. Pt has family hx of long QT syndrome, several cousins as well as his siblings have tested + for it. Pt denies any chest pain, SOB or palpitations.    Plan:  Prolonged QT  - EKG shows QT of 450 ms  - Continue tele monitoring  - Avoid all QT prolonging medications Assessment: Pt is a 59 yo M with hx of Meniere's disease brought to ED for vertigo with dizziness, N/V x 3 days. Pt reports taking "6 pills" of dramamine at home with little relief. Sts he went to urgent care this morning and was told his BP was elevated and to go to the ED. Pt still reports feeling lightheaded but no vertiginous symptoms at this time. In the ED, pts BP was 232/122 and he was given amlodipine 5 mg and labetalol 10 mg as well as started on Metoprolol 25 mg. Pts BP now 171/79, reports no previous hx of HTN and pt takes no other medications besides the OTC dramamine at home. Pt reports +ETOH use and admits to drinking 3 glasses of wine when sx started. Pt has family hx of long QT syndrome, several cousins as well as his siblings have tested + for it. Pt denies any chest pain, SOB or palpitations.    Plan:  Prolonged QT  - EKG shows QT of 450 ms  - Continue tele monitoring  - Avoid all QT prolonging medications  - Recommend genetic testing for prolonged QT syndrome  - Follow up with Dr Leach in office

## 2019-01-09 NOTE — CONSULT NOTE ADULT - SUBJECTIVE AND OBJECTIVE BOX
Neurology Consult    Patient is a 58y old  Male who presents with a chief complaint of Acute onset vertigo and nausea/vomiting x 3 days (08 Jan 2019 21:11)      HPI:  57 yo M with PMHx of Meniere's disease (L ear, diagnosed 20 years ago) with residual L-sided hearing loss and constant tinnitus, presents with acute onset of vertigo, nausea/vomiting x 3 days. Symptoms began on Saturday 7 pm, while he was watching a football game, had drank 3 glasses of wine at the time. He felt everything began to spin in large motions, improved with eye closure, associated with intense nausea, dry heaves and multiple episodes of vomiting,, sweating, ringing in the ear. He was able to go to sleep. The following day, the nausea had improved slightly but he remained unsteady of his feet and he was able to tolerate small meals. He went to Urgent Care on Monday where they noted he had high blood pressure and he was sent to the hospital. He denies chest pain, palpitations, blurry vision, paresthesia/weakness. On admission he was /122, HR 90s. He was given Amlodipine 5 mg, Labetalol IV 10 mg x 1. NS 1 L x 1.     He admits he is a binge-drinker. Drinks approximately 3-5 glasses of wine per occasion, 4-5 times a month. Never smoked. He has taken Amoxicillin prescribed before by physicians. Penicillin allergy was noted as an infant. He has no recollection.     His father has passed away at 36 yo from MI. He had uncontrolled hypertension. His mom passed away at 69 from metastatic peritoneal mesothelioma. Father's family has long QT syndrome. (08 Jan 2019 21:11)      PAST MEDICAL & SURGICAL HISTORY:  Gout  Vertigo  No significant past surgical history      FAMILY HISTORY:  Family history of cancer (Mother)  Family history of heart attack (Father)  Family history of long QT syndrome (Father)      Social History: (-) x 3    Allergies    No Known Allergies    Intolerances        MEDICATIONS  (STANDING):  amLODIPine   Tablet 5 milliGRAM(s) Oral once  aspirin  chewable 81 milliGRAM(s) Oral daily  atorvastatin 80 milliGRAM(s) Oral at bedtime  chlorhexidine 4% Liquid 1 Application(s) Topical <User Schedule>  enoxaparin Injectable 40 milliGRAM(s) SubCutaneous every 24 hours  hydrochlorothiazide 25 milliGRAM(s) Oral daily  metoprolol tartrate 25 milliGRAM(s) Oral two times a day  pantoprazole    Tablet 40 milliGRAM(s) Oral before breakfast    MEDICATIONS  (PRN):  acetaminophen   Tablet .. 650 milliGRAM(s) Oral every 6 hours PRN Mild Pain (1 - 3)      Review of systems:    Constitutional: as per HPI  Eyes: No eye pain or discharge  ENMT:  No difficulty hearing; No sinus or throat pain  Neck: No pain or stiffness  Respiratory: No cough, wheezing, chills or hemoptysis  Cardiovascular: No chest pain, palpitations, shortness of breath, dyspnea on exertion  Gastrointestinal: No abdominal pain, nausea, vomiting or hematemesis; No diarrhea or constipation.   Genitourinary: No dysuria, frequency, hematuria or incontinence  Neurological: As per HPI  Skin: No rashes or lesions   Endocrine: No heat or cold intolerance; No hair loss  Musculoskeletal: No joint pain or swelling  Psychiatric: No depression, anxiety, mood swings  Heme/Lymph: No easy bruising or bleeding gums    Vital Signs Last 24 Hrs  T(C): 37.1 (09 Jan 2019 07:21), Max: 37.1 (09 Jan 2019 07:21)  T(F): 98.7 (09 Jan 2019 07:21), Max: 98.7 (09 Jan 2019 07:21)  HR: 82 (09 Jan 2019 11:38) (70 - 96)  BP: 171/79 (09 Jan 2019 11:38) (167/77 - 232/122)  BP(mean): --  RR: 20 (09 Jan 2019 07:21) (18 - 20)  SpO2: 99% (09 Jan 2019 07:21) (96% - 100%)    Examination:  General:  Appearance is consistent with chronologic age.  No abnormal facies.  Gross skin survey within normal limits.    Cognitive/Language:  The patient is oriented to person, place, time and date.  Recent and remote memory intact.  Fund of knowledge is intact and normal.  Language with normal repetition, comprehension and naming.  Nondysarthric.    Eyes: intact VA, VFF.  EOMI w/o nystagmus, skew or reported double vision.  PERRL.  No ptosis/weakness of eyelid closure.    Face:  Facial sensation normal V1 - 3, no facial asymmetry.    Ears/Nose/Throat:  Hearing grossly intact b/l.  Palate elevates midline.  Tongue and uvula midline.   Motor examination:   Normal tone, bulk and range of motion.  No tenderness, twitching, tremors or involuntary movements.  Formal Muscle Strength Testing: (MRC grade R/L) 5/5 UE; 5/5 LE.  No observable drift.  Reflexes:   2+ b/l pectoralis, biceps, triceps, brachioradialis, patella and Achilles.  Plantar response downgoing b/l.  Jaw jerk, Miranda, clonus absent.  Sensory examination:   Intact to light touch and pinprick, pain, temperature and proprioception and vibration in all extremities.  Cerebellum:   FTN/HKS intact with normal MARISA in all limbs.  No dysmetria or dysdiadokinesia.  Gait narrow based and normal.    Respiratory:  no audible wheezing or inspiratory stridor.  no use of accessory muscles.   Cardiac: pulse palpable, no audible bruits  Abdomen: supple, no guarding, no TTP    Labs:   CBC Full  -  ( 09 Jan 2019 08:00 )  WBC Count : 9.72 K/uL  Hemoglobin : 15.0 g/dL  Hematocrit : 44.6 %  Platelet Count - Automated : 193 K/uL  Mean Cell Volume : 87.3 fL  Mean Cell Hemoglobin : 29.4 pg  Mean Cell Hemoglobin Concentration : 33.6 g/dL  Auto Neutrophil # : 5.64 K/uL  Auto Lymphocyte # : 2.74 K/uL  Auto Monocyte # : 0.67 K/uL  Auto Eosinophil # : 0.56 K/uL  Auto Basophil # : 0.09 K/uL  Auto Neutrophil % : 58.0 %  Auto Lymphocyte % : 28.2 %  Auto Monocyte % : 6.9 %  Auto Eosinophil % : 5.8 %  Auto Basophil % : 0.9 %    01-09    144  |  101  |  17  ----------------------------<  107<H>  3.8   |  27  |  0.9    Ca    9.1      09 Jan 2019 08:00  Mg     2.0     01-09    TPro  6.9  /  Alb  4.2  /  TBili  0.3  /  DBili  <0.2  /  AST  86<H>  /  ALT  44<H>  /  AlkPhos  61  01-09    LIVER FUNCTIONS - ( 09 Jan 2019 08:00 )  Alb: 4.2 g/dL / Pro: 6.9 g/dL / ALK PHOS: 61 U/L / ALT: 44 U/L / AST: 86 U/L / GGT: x           PT/INR - ( 09 Jan 2019 08:00 )   PT: 12.00 sec;   INR: 1.04 ratio         PTT - ( 09 Jan 2019 08:00 )  PTT:31.6 sec        Neuroimaging:  NCHCT: CT Head No Cont:   EXAM:  CT BRAIN            PROCEDURE DATE:  01/08/2019            INTERPRETATION:  Clinical History / Reason for exam: Dizziness    Technique: Multiple contiguous axial CT images of the head were obtained    from the base of the skull to the vertexwithout administration of   intravenous contrast. Coronal and sagittal reformats were obtained.    Comparison: None    Findings:    The ventricles, basal cisterns and sulcal pattern are within normal   limits for the patient's stated age.      Grey-white differentiation is preserved.    There is no acute mass effect, midline shift or intracranial hemorrhage.      The calvarium is intact.    The visualized paranasal sinuses and bilateral mastoid complexes are   unremarkable.     Impression:     NoCT evidence for acute intracranial pathology.              NELSON MERCADO M.D., RESIDENT RADIOLOGIST  This document has been electronically signed.  LJ COFFEY M.D., ATTENDING RADIOLOGIST  This document has been electronically signed. Jan 8 2019 6:57PM             (01-08-19 @ 17:02)      01-09-19 @ 11:51 Neurology Consult    Patient is a 58y old  Male who presents with a chief complaint of Acute onset vertigo and nausea/vomiting x 3 days (08 Jan 2019 21:11)      HPI:  57 yo M with PMHx of Meniere's disease (L ear, diagnosed 20 years ago) with residual L-sided hearing loss and constant tinnitus, presents with acute onset of vertigo, nausea/vomiting x 3 days. Symptoms began on Saturday 7 pm, while he was watching a football game, had drank 3 glasses of wine at the time. He felt everything began to spin in large motions, improved with eye closure, associated with intense nausea, dry heaves and multiple episodes of vomiting,, sweating, ringing in the ear. He was able to go to sleep. The following day, the nausea had improved slightly but he remained unsteady of his feet and he was able to tolerate small meals. He went to Urgent Care on Monday where they noted he had high blood pressure and he was sent to the hospital. He denies chest pain, palpitations, blurry vision, paresthesia/weakness. On admission he was /122, HR 90s. He was given Amlodipine 5 mg, Labetalol IV 10 mg x 1. NS 1 L x 1.     He admits he is a binge-drinker. Drinks approximately 3-5 glasses of wine per occasion, 4-5 times a month. Never smoked. He has taken Amoxicillin prescribed before by physicians. Penicillin allergy was noted as an infant. He has no recollection.     His father has passed away at 36 yo from MI. He had uncontrolled hypertension. His mom passed away at 69 from metastatic peritoneal mesothelioma. Father's family has long QT syndrome. (08 Jan 2019 21:11)      PAST MEDICAL & SURGICAL HISTORY:  Gout  Vertigo  No significant past surgical history      FAMILY HISTORY:  Family history of cancer (Mother)  Family history of heart attack (Father)  Family history of long QT syndrome (Father)      Social History: (-) x 3    Allergies    No Known Allergies    Intolerances        MEDICATIONS  (STANDING):  amLODIPine   Tablet 5 milliGRAM(s) Oral once  aspirin  chewable 81 milliGRAM(s) Oral daily  atorvastatin 80 milliGRAM(s) Oral at bedtime  chlorhexidine 4% Liquid 1 Application(s) Topical <User Schedule>  enoxaparin Injectable 40 milliGRAM(s) SubCutaneous every 24 hours  hydrochlorothiazide 25 milliGRAM(s) Oral daily  metoprolol tartrate 25 milliGRAM(s) Oral two times a day  pantoprazole    Tablet 40 milliGRAM(s) Oral before breakfast    MEDICATIONS  (PRN):  acetaminophen   Tablet .. 650 milliGRAM(s) Oral every 6 hours PRN Mild Pain (1 - 3)      Review of systems:    Constitutional: as per HPI  Eyes: No eye pain or discharge  ENMT:  No difficulty hearing; No sinus or throat pain  Neck: No pain or stiffness  Respiratory: No cough, wheezing, chills or hemoptysis  Cardiovascular: No chest pain, palpitations, shortness of breath, dyspnea on exertion  Gastrointestinal: No abdominal pain, nausea, vomiting or hematemesis; No diarrhea or constipation.   Genitourinary: No dysuria, frequency, hematuria or incontinence  Neurological: As per HPI  Skin: No rashes or lesions   Endocrine: No heat or cold intolerance; No hair loss  Musculoskeletal: No joint pain or swelling  Psychiatric: No depression, anxiety, mood swings  Heme/Lymph: No easy bruising or bleeding gums    Vital Signs Last 24 Hrs  T(C): 37.1 (09 Jan 2019 07:21), Max: 37.1 (09 Jan 2019 07:21)  T(F): 98.7 (09 Jan 2019 07:21), Max: 98.7 (09 Jan 2019 07:21)  HR: 82 (09 Jan 2019 11:38) (70 - 96)  BP: 171/79 (09 Jan 2019 11:38) (167/77 - 232/122)  BP(mean): --  RR: 20 (09 Jan 2019 07:21) (18 - 20)  SpO2: 99% (09 Jan 2019 07:21) (96% - 100%)    Examination:  General:  Appearance is consistent with chronologic age.  No abnormal facies.  Gross skin survey within normal limits.    Cognitive/Language:  The patient is oriented to person, place, time and date.  Recent and remote memory intact.  Fund of knowledge is intact and normal.  Language with normal repetition, comprehension and naming.  Nondysarthric.    Eyes: intact VA, VFF.  EOMI w/o nystagmus, skew or reported double vision.  PERRL.  No ptosis/weakness of eyelid closure.    Face:  Facial sensation normal V1 - 3, no facial asymmetry.    Ears/Nose/Throat:  Hearing grossly intact b/l.  Palate elevates midline.  Tongue and uvula midline.   Motor examination:   Normal tone, bulk and range of motion.  No tenderness, twitching, tremors or involuntary movements.  Formal Muscle Strength Testing: (MRC grade R/L) 5/5 UE; 5/5 LE.  No observable drift.  Reflexes:   2+ b/l pectoralis, biceps, triceps, brachioradialis, patella and Achilles.  Plantar response downgoing b/l.  Jaw jerk, Miranda, clonus absent.  Sensory examination:   Intact to light touch and pinprick, pain, temperature and proprioception and vibration in all extremities.  Cerebellum:   FTN/HKS intact with normal MARISA in all limbs.  No dysmetria or dysdiadokinesia.  Gait narrow based and normal.    Respiratory:  no audible wheezing or inspiratory stridor.  no use of accessory muscles.   Cardiac: pulse palpable, no audible bruits  Abdomen: supple, no guarding, no TTP    Labs:   CBC Full  -  ( 09 Jan 2019 08:00 )  WBC Count : 9.72 K/uL  Hemoglobin : 15.0 g/dL  Hematocrit : 44.6 %  Platelet Count - Automated : 193 K/uL  Mean Cell Volume : 87.3 fL  Mean Cell Hemoglobin : 29.4 pg  Mean Cell Hemoglobin Concentration : 33.6 g/dL  Auto Neutrophil # : 5.64 K/uL  Auto Lymphocyte # : 2.74 K/uL  Auto Monocyte # : 0.67 K/uL  Auto Eosinophil # : 0.56 K/uL  Auto Basophil # : 0.09 K/uL  Auto Neutrophil % : 58.0 %  Auto Lymphocyte % : 28.2 %  Auto Monocyte % : 6.9 %  Auto Eosinophil % : 5.8 %  Auto Basophil % : 0.9 %    01-09    144  |  101  |  17  ----------------------------<  107<H>  3.8   |  27  |  0.9    Ca    9.1      09 Jan 2019 08:00  Mg     2.0     01-09    TPro  6.9  /  Alb  4.2  /  TBili  0.3  /  DBili  <0.2  /  AST  86<H>  /  ALT  44<H>  /  AlkPhos  61  01-09    LIVER FUNCTIONS - ( 09 Jan 2019 08:00 )  Alb: 4.2 g/dL / Pro: 6.9 g/dL / ALK PHOS: 61 U/L / ALT: 44 U/L / AST: 86 U/L / GGT: x           PT/INR - ( 09 Jan 2019 08:00 )   PT: 12.00 sec;   INR: 1.04 ratio         PTT - ( 09 Jan 2019 08:00 )  PTT:31.6 sec        Neuroimaging:  NCHCT: CT Head No Cont:   EXAM:  CT BRAIN            PROCEDURE DATE:  01/08/2019            INTERPRETATION:  Clinical History / Reason for exam: Dizziness    Technique: Multiple contiguous axial CT images of the head were obtained    from the base of the skull to the vertexwithout administration of   intravenous contrast. Coronal and sagittal reformats were obtained.    Comparison: None    Findings:    The ventricles, basal cisterns and sulcal pattern are within normal   limits for the patient's stated age.      Grey-white differentiation is preserved.    There is no acute mass effect, midline shift or intracranial hemorrhage.      The calvarium is intact.    The visualized paranasal sinuses and bilateral mastoid complexes are   unremarkable.     Impression:     NoCT evidence for acute intracranial pathology.

## 2019-01-09 NOTE — CONSULT NOTE ADULT - PROBLEM SELECTOR RECOMMENDATION 9
cont norvasc and increase to 10  cont low dose metoprolol  consider diovan 80 q24  would probably long term hctz in pt with long qt cont norvasc and increase to 10  cont low dose metoprolol  consider diovan 80 q24 and d/c hctz  would probably long term hctz in pt with long qt

## 2019-01-09 NOTE — CONSULT NOTE ADULT - SUBJECTIVE AND OBJECTIVE BOX
Patient is a 58y old  Male who presents with a chief complaint of Acute onset vertigo and nausea/vomiting x 3 days (2019 11:50)    HPI: Pt is a 57 yo M with hx of Meniere's disease brought to ED for vertigo with dizziness, N/V x 3 days. Pt reports taking "6 pills" of dramamine at home with little relief. Sts he went to urgent care this morning and was told his BP was elevated and to go to the ED. Pt still reports feeling lightheaded but no vertiginous symptoms at this time. In the ED, pts BP was 232/122 and he was given amlodipine 5 mg and labetalol 10 mg as well as started on Metoprolol 25 mg. Pts BP now 171/79, reports no previous hx of HTN and pt takes no other medications besides the OTC dramamine at home. Pt reports +ETOH use and admits to drinking 3 glasses of wine when sx started. Pt has family hx of long QT syndrome, several cousins as well as his siblings have tested + for it. Pt denies any chest pain, SOB or palpitations.    PAST MEDICAL & SURGICAL HISTORY:  Gout  Vertigo  No significant past surgical history    PREVIOUS DIAGNOSTIC TESTING:      ECHO  FINDINGS: No report    STRESS  FINDINGS: No testing    MEDICATIONS  (STANDING):  aspirin  chewable 81 milliGRAM(s) Oral daily  atorvastatin 80 milliGRAM(s) Oral at bedtime  chlorhexidine 4% Liquid 1 Application(s) Topical <User Schedule>  enoxaparin Injectable 40 milliGRAM(s) SubCutaneous every 24 hours  hydrochlorothiazide 25 milliGRAM(s) Oral daily  metoprolol tartrate 25 milliGRAM(s) Oral two times a day  pantoprazole    Tablet 40 milliGRAM(s) Oral before breakfast    MEDICATIONS  (PRN):  acetaminophen   Tablet .. 650 milliGRAM(s) Oral every 6 hours PRN Mild Pain (1 - 3)      FAMILY HISTORY:  Family history of cancer (Mother)  Family history of heart attack (Father)  Family history of long QT syndrome (Father)      SOCIAL HISTORY:    CIGARETTES: No    ALCOHOL: Yes    Past Surgical History:    Allergies:    No Known Allergies      REVIEW OF SYSTEMS:  CONSTITUTIONAL: No fever, weight loss, chills, shakes, or fatigue  EYES: No eye pain, visual disturbances, or discharge  RESPIRATORY: No cough, wheezing, hemoptysis, or shortness of breath  CARDIOVASCULAR: No chest pain, dyspnea, palpitations, dizziness, syncope, paroxysmal nocturnal dyspnea, orthopnea, or arm or leg swelling  GASTROINTESTINAL: No abdominal  or epigastric pain, nausea, vomiting, hematemesis, diarrhea, constipation, melena or bright red blood.  NEUROLOGICAL: +Lightheadedness  MUSCULOSKELETAL: No joint pain or swelling, muscle, back, or extremity pain    Vital Signs Last 24 Hrs  T(C): 37.1 (2019 07:21), Max: 37.1 (2019 07:21)  T(F): 98.7 (2019 07:21), Max: 98.7 (2019 07:21)  HR: 82 (2019 11:38) (70 - 89)  BP: 171/79 (2019 11:38) (167/77 - 228/108)  BP(mean): --  RR: 20 (2019 07:21) (20 - 20)  SpO2: 99% (2019 07:21) (96% - 100%)    PHYSICAL EXAM:  GENERAL: In no apparent distress, well nourished, and hydrated.  NECK: Supple and normal thyroid.  No JVD or carotid bruit.  Carotid pulse is 2+ bilaterally.  HEART: Regular rate and rhythm; No murmurs, rubs, or gallops.  PULMONARY: Clear to auscultation and perfusion.  No rales, wheezing, or rhonchi bilaterally.  ABDOMEN: Soft, Nontender, Nondistended; Bowel sounds present  EXTREMITIES:  2+ Peripheral Pulses, No clubbing, cyanosis, or edema  NEUROLOGICAL: Grossly nonfocal      INTERPRETATION OF TELEMETRY: NSR @ 76 bpm    EC Lead ECG (19 @ 14:32)    Ventricular Rate 76 BPM    Atrial Rate 76 BPM    P-R Interval 182 ms    QRS Duration 158 ms    Q-T Interval 450 ms    QTC Calculation(Bezet) 506 ms    P Axis 45 degrees    R Axis -18 degrees    T Axis 28 degrees    Diagnosis Line Normal sinus rhythm  Right bundle branch block  Abnormal ECG    Confirmed by MAUREEN LLAMAS MD ([id number]) on 2019 3:45:59 PM    I&O's Detail      LABS:                        15.0   9.72  )-----------( 193      ( 2019 08:00 )             44.6     -    144  |  101  |  17  ----------------------------<  107<H>  3.8   |  27  |  0.9    Ca    9.1      2019 08:00  Mg     2.0         TPro  6.9  /  Alb  4.2  /  TBili  0.3  /  DBili  <0.2  /  AST  86<H>  /  ALT  44<H>  /  AlkPhos  61      CARDIAC MARKERS ( 2019 15:00 )  x     / <0.01 ng/mL / x     / x     / x          PT/INR - ( 2019 08:00 )   PT: 12.00 sec;   INR: 1.04 ratio         PTT - ( 2019 08:00 )  PTT:31.6 sec    RADIOLOGY & ADDITIONAL STUDIES:    CT Head No Cont (19 @ 17:02)  EXAM:  CT BRAIN            PROCEDURE DATE:  2019        INTERPRETATION:  Clinical History / Reason for exam: Dizziness    Technique: Multiple contiguous axial CT images of the head were obtained    from the base of the skull to the vertexwithout administration of   intravenous contrast. Coronal and sagittal reformats were obtained.    Comparison: None    Findings:    The ventricles, basal cisterns and sulcal pattern are within normal   limits for the patient's stated age.      Grey-white differentiation is preserved.    There is no acute mass effect, midline shift or intracranial hemorrhage.      The calvarium is intact.    The visualized paranasal sinuses and bilateral mastoid complexes are   unremarkable.     Impression:     NoCT evidence for acute intracranial pathology.      NELSON MERCADO M.D., RESIDENT RADIOLOGIST  This document has been electronically signed.  LJ COFFEY M.D., ATTENDING RADIOLOGIST  This document has been electronically signed. 2019 6:57PM

## 2019-01-10 ENCOUNTER — TRANSCRIPTION ENCOUNTER (OUTPATIENT)
Age: 59
End: 2019-01-10

## 2019-01-10 VITALS
SYSTOLIC BLOOD PRESSURE: 162 MMHG | RESPIRATION RATE: 18 BRPM | DIASTOLIC BLOOD PRESSURE: 75 MMHG | TEMPERATURE: 98 F | HEART RATE: 73 BPM

## 2019-01-10 LAB
ANION GAP SERPL CALC-SCNC: 16 MMOL/L — HIGH (ref 7–14)
BUN SERPL-MCNC: 23 MG/DL — HIGH (ref 10–20)
CALCIUM SERPL-MCNC: 9.5 MG/DL — SIGNIFICANT CHANGE UP (ref 8.5–10.1)
CHLORIDE SERPL-SCNC: 96 MMOL/L — LOW (ref 98–110)
CO2 SERPL-SCNC: 30 MMOL/L — SIGNIFICANT CHANGE UP (ref 17–32)
CREAT SERPL-MCNC: 1.3 MG/DL — SIGNIFICANT CHANGE UP (ref 0.7–1.5)
GLUCOSE SERPL-MCNC: 123 MG/DL — HIGH (ref 70–99)
MAGNESIUM SERPL-MCNC: 2.4 MG/DL — SIGNIFICANT CHANGE UP (ref 1.8–2.4)
POTASSIUM SERPL-MCNC: 4 MMOL/L — SIGNIFICANT CHANGE UP (ref 3.5–5)
POTASSIUM SERPL-SCNC: 4 MMOL/L — SIGNIFICANT CHANGE UP (ref 3.5–5)
SODIUM SERPL-SCNC: 142 MMOL/L — SIGNIFICANT CHANGE UP (ref 135–146)
TSH SERPL-MCNC: 2.13 UIU/ML — SIGNIFICANT CHANGE UP (ref 0.27–4.2)

## 2019-01-10 RX ORDER — METOPROLOL TARTRATE 50 MG
1 TABLET ORAL
Qty: 60 | Refills: 2
Start: 2019-01-10 | End: 2019-04-09

## 2019-01-10 RX ORDER — LOSARTAN POTASSIUM 100 MG/1
1 TABLET, FILM COATED ORAL
Qty: 30 | Refills: 2
Start: 2019-01-10 | End: 2019-04-09

## 2019-01-10 RX ORDER — AMLODIPINE BESYLATE 2.5 MG/1
1 TABLET ORAL
Qty: 30 | Refills: 2
Start: 2019-01-10 | End: 2019-04-09

## 2019-01-10 RX ORDER — ASPIRIN/CALCIUM CARB/MAGNESIUM 324 MG
1 TABLET ORAL
Qty: 30 | Refills: 2
Start: 2019-01-10 | End: 2019-04-09

## 2019-01-10 RX ORDER — MECLIZINE HCL 12.5 MG
1 TABLET ORAL
Qty: 21 | Refills: 0
Start: 2019-01-10 | End: 2019-01-16

## 2019-01-10 RX ORDER — MECLIZINE HCL 12.5 MG
25 TABLET ORAL THREE TIMES A DAY
Qty: 0 | Refills: 0 | Status: DISCONTINUED | OUTPATIENT
Start: 2019-01-10 | End: 2019-01-10

## 2019-01-10 RX ORDER — ATORVASTATIN CALCIUM 80 MG/1
1 TABLET, FILM COATED ORAL
Qty: 30 | Refills: 2
Start: 2019-01-10 | End: 2019-04-09

## 2019-01-10 RX ORDER — LOSARTAN POTASSIUM 100 MG/1
50 TABLET, FILM COATED ORAL DAILY
Qty: 0 | Refills: 0 | Status: DISCONTINUED | OUTPATIENT
Start: 2019-01-10 | End: 2019-01-10

## 2019-01-10 RX ADMIN — Medication 25 MILLIGRAM(S): at 16:25

## 2019-01-10 RX ADMIN — AMLODIPINE BESYLATE 10 MILLIGRAM(S): 2.5 TABLET ORAL at 05:19

## 2019-01-10 RX ADMIN — LOSARTAN POTASSIUM 50 MILLIGRAM(S): 100 TABLET, FILM COATED ORAL at 11:03

## 2019-01-10 RX ADMIN — Medication 81 MILLIGRAM(S): at 11:46

## 2019-01-10 RX ADMIN — Medication 25 MILLIGRAM(S): at 05:19

## 2019-01-10 RX ADMIN — PANTOPRAZOLE SODIUM 40 MILLIGRAM(S): 20 TABLET, DELAYED RELEASE ORAL at 11:46

## 2019-01-10 NOTE — DISCHARGE NOTE ADULT - PATIENT PORTAL LINK FT
You can access the [facility], offered by [facility], by registering with the following website: [email or web address]

## 2019-01-10 NOTE — DISCHARGE NOTE ADULT - MEDICATION SUMMARY - MEDICATIONS TO TAKE
I will START or STAY ON the medications listed below when I get home from the hospital:    aspirin 81 mg oral tablet, chewable  -- 1 tab(s) by mouth once a day  -- Indication: For Hypertension    losartan 50 mg oral tablet  -- 1 tab(s) by mouth once a day  -- Indication: For Hypertension    atorvastatin 40 mg oral tablet  -- 1 tab(s) by mouth once (at bedtime)   -- Avoid grapefruit and grapefruit juice while taking this medication.  Do not take this drug if you are pregnant.  It is very important that you take or use this exactly as directed.  Do not skip doses or discontinue unless directed by your doctor.  Obtain medical advice before taking any non-prescription drugs as some may affect the action of this medication.  Take with food or milk.    -- Indication: For Hypertension    metoprolol tartrate 25 mg oral tablet  -- 1 tab(s) by mouth 2 times a day  -- Indication: For Hypertension    amLODIPine 10 mg oral tablet  -- 1 tab(s) by mouth once a day  -- Indication: For Hypertension I will START or STAY ON the medications listed below when I get home from the hospital:    aspirin 81 mg oral tablet, chewable  -- 1 tab(s) by mouth once a day  -- Indication: For Hypertension    losartan 50 mg oral tablet  -- 1 tab(s) by mouth once a day  -- Indication: For Hypertension    meclizine 25 mg oral tablet  -- 1 tab(s) by mouth 3 times a day, As needed, Dizziness  -- Indication: For DIZZINESS    atorvastatin 40 mg oral tablet  -- 1 tab(s) by mouth once (at bedtime)   -- Avoid grapefruit and grapefruit juice while taking this medication.  Do not take this drug if you are pregnant.  It is very important that you take or use this exactly as directed.  Do not skip doses or discontinue unless directed by your doctor.  Obtain medical advice before taking any non-prescription drugs as some may affect the action of this medication.  Take with food or milk.    -- Indication: For Hypertension    metoprolol tartrate 25 mg oral tablet  -- 1 tab(s) by mouth 2 times a day  -- Indication: For Hypertension    amLODIPine 10 mg oral tablet  -- 1 tab(s) by mouth once a day  -- Indication: For Hypertension

## 2019-01-10 NOTE — DISCHARGE NOTE ADULT - INSTRUCTIONS
Please adhere to a diet that is low in sodium (salt): Approximately <2g daily    For your gout, you should eat more vegetables, fruits, and whole grains. Avoid fatty foods such as red meats, cooked meats, fried foods, artificial sweeteners, and dairy products. You should avoid alcohol. You should also stay well-hydrated with water. Additionally, weight loss with aid in treating your gout.

## 2019-01-10 NOTE — DISCHARGE NOTE ADULT - PLAN OF CARE
Resolution with medical management Medical management Your blood pressure upon arrival to the emergency department was found to be 232/122. Your blood pressure improved with the medications sent to your pharmacy. Please continue to take these medications until otherwise instructed by your primary care physician. Please record your blood pressure with a blood pressure machine regularly to learn your baseline, and to determine if your medications are too much or too little. If your blood pressure is low, or you feel symptomatic (dizzy/lightheaded), check your pressure. Do not take your medications if your systolic (top number) is <100 or if your diastolic (bottom number) is <60. You should try to follow up with your primary doctor within 1 week or less after discharge. You presented with a complaint of dizziness. This is a chronic complaint. Please follow up with neurology soon after discharge for further assessment and management. They recommended you follow up for vestibular rehabilitation while you were admitted. You should also follow up with ENT. You have a history of prolonged QTc on EKG. Please follow up with Dr. Leahc soon after discharge. You should ask your PMD to perform your perform your genetic testing for prolonged QT syndrome before following up with EPS (Dr. Leach).

## 2019-01-10 NOTE — PROGRESS NOTE ADULT - ASSESSMENT
This is a 58 year old male whoe presented with a cc/o dizzines x3 days. His symptoms are associated with nausea/vomiting and tinnitus. His onset of symptoms began while watching football, and admits to 3 glasses of wine at that time. He described his dizzines and "spinning", The following day, he felt unsteady on his feet. He went to an urgent care where he was found to have high blood pressure, and subsequently sent to the hospital. On presentation, his BP was 232/122, HR 90s. He was admitted with a working diagnosis of hypertensive emergency.    PMHx: Meniere's disease (L ear, diagnosed 20 years ago) with residual L-sided hearing loss and constant tinnitus; Gout; HTN; Long QT syndrome    ===========================================================  Today/Updates:  ===========================================================    Hypertensive emergency; with associated dizziness and Nausea/Vomiting  - Cardiology recommendations appreciated: Consider Diovan 80mg PO daily  - Continue Metoprolol tartrate 25mg PO Q12H  - Increased Amlodipine to 10mg PO daily  - Started HCTZ 25mg PO daily  - Monitoring BP    Vertigo, with associated dizziness and Nausea/Vomiting; Hx of Meniere's disease   - Neurology recommendations appreciated: Consider Meclizine 25mg PO Q8H x7-10 days  and refer to vertigo rehabilitation  - Await MRI Head and neck (CANCELED)    Hx of Prolonged QT syndrome  - EPS recommendations appreciated: Consider genetic workup for prolonged QT and follow up with Dr. Leach  - Avoid QT prolonging agents, if possible  - Follow-up 2D Echo    Hx of Hypertension  - Continue antihypertensives as above  - Continue Atorvastatin 80mg PO daily and Aspirin 81mg PO daily    Electrolyte Imbalances: Correct as needed  []  Hyponatremia   /   Hypernatremia  []   []  Hypokalemia   /   Hyperkalemia  []   []  Hypochloremia   /    Hyperchloremia  []   []  Hypocapnia   /   Hypercapnia  []   []  Hypomagnesemia   /   Hypermagnesemia  []   []  Hypophosphatemia   /   Hyperphosphatemia  []       GI ppx:                                   [] Not indicated   /   [X] Pantoprazole 40mg PO Daily    DVT ppx:  [] Not indicated / [] Heparin 5000mg SubQ / [X] Lovenox 40mg SubQ / [] SCDs    Fluids:   [X] PO  |  [] IVF    Activity:  [] Ad Li  /  [X] Increase as Tolerated  /  [] OOB w/ assist  /  [] Bed Rest    BMI:  Height (cm): 167.64 (01-09)  Weight (kg): 86.3 (01-09)  BMI (kg/m2): 30.7 (01-09)    DISPO:  Patient to be discharged when condition(s) optimized.  [] Home  /  [] SNF  /  [] Long Term       [X] Discussion with patient and/or proxy regarding goals of care.  [X] Discussed Case and Plan with the Medical Attending.    CODE STATUS  [X] FULL   /    [] DNR    Please call Dr. Pabon [PGY-1] with any questions/consult recs: Spectra #[id number] This is a 58 year old male whoe presented with a cc/o dizzines x3 days. His symptoms are associated with nausea/vomiting and tinnitus. His onset of symptoms began while watching football, and admits to 3 glasses of wine at that time. He described his dizzines and "spinning", The following day, he felt unsteady on his feet. He went to an urgent care where he was found to have high blood pressure, and subsequently sent to the hospital. On presentation, his BP was 232/122, HR 90s. He was admitted with a working diagnosis of hypertensive emergency.    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 Additionally, will follow cardiology recommendations with starting ARB and D/D HCTZ. Patient is agreeable to D/C home if/when ready.  ===========================================================    Hypertensive emergency; with associated dizziness and Nausea/Vomiting  - Cardiology recommendations appreciated: Consider Diovan 80mg PO daily; Started Losartan 50mg PO daily  - Continue Metoprolol tartrate 25mg PO Q12H  - Increased Amlodipine to 10mg PO daily  - D/C HCTZ 25mg PO daily  - Monitoring BP    Vertigo, with associated dizziness and Nausea/Vomiting; Hx of Meniere's disease   - Neurology recommendations appreciated: Consider Meclizine 25mg PO Q8H x7-10 days  and refer to vestibular rehabilitation  - Await MRI Head and neck (canceled as patient was unavailable); Likely can be done as OP    Hx of Prolonged QT syndrome  - EPS recommendations appreciated: Consider genetic workup for prolonged QT and follow up with Dr. Leach  - Avoid QT prolonging agents, if possible  - Follow-up 2D Echo    Hx of Hypertension  - Continue antihypertensives as above  - Continue Atorvastatin 80mg PO daily and Aspirin 81mg PO daily    Electrolyte Imbalances: WNL      GI ppx:                                   [] Not indicated   /   [X] Pantoprazole 40mg PO Daily    DVT ppx:  [] Not indicated / [] Heparin 5000mg SubQ / [X] Lovenox 40mg SubQ / [] SCDs    Fluids:   [X] PO  |  [] IVF    Activity:  [] Ad Li  /  [X] Increase as Tolerated  /  [] OOB w/ assist  /  [] Bed Rest    BMI:  Height (cm): 167.64 (01-09)  Weight (kg): 86.3 (01-09)  BMI (kg/m2): 30.7 (01-09)    DISPO:  Patient to be discharged when condition(s) optimized.  [] Home  /  [] SNF  /  [] Long Term       [X] Discussion with patient and/or proxy regarding goals of care.  [X] Discussed Case and Plan with the Medical Attending.    CODE STATUS  [X] FULL   /    [] DNR    Please call Dr. Pabon [PGY-1] with any questions/consult recs: Spectra #[id number] This is a 58 year old male whoe presented with a cc/o dizzines x3 days. His symptoms are associated with nausea/vomiting and tinnitus. His onset of symptoms began while watching football, and admits to 3 glasses of wine at that time. He described his dizzines and "spinning", The following day, he felt unsteady on his feet. He went to an urgent care where he was found to have high blood pressure, and subsequently sent to the hospital. On presentation, his BP was 232/122, HR 90s. He was admitted with a working diagnosis of hypertensive emergency.    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 Additionally, will follow cardiology recommendations with starting ARB and D/D HCTZ. Patient is agreeable to D/C home if/when ready.  ===========================================================    Hypertensive emergency; with associated dizziness and Nausea/Vomiting  - Cardiology recommendations appreciated: Consider Diovan 80mg PO daily; Started Losartan 50mg PO daily  - Continue Metoprolol tartrate 25mg PO Q12H  - Increased Amlodipine to 10mg PO daily  - D/C HCTZ 25mg PO daily  - Monitoring BP    Vertigo, with associated dizziness and Nausea/Vomiting; Hx of Meniere's disease   - Neurology recommendations appreciated: Consider Meclizine 25mg PO Q8H x7-10 days and refer to vestibular rehabilitation  - MRI Head and neck (canceled as patient was unavailable); Likely can be done as OP    Hx of Prolonged QT syndrome  - EPS recommendations appreciated: genetic workup for prolonged QT as OP and follow up with Dr. Leach  - Avoid QT prolonging agents, if possible  - Follow-up 2D Echo    Hx of Hypertension  - Continue antihypertensives as above  - Continue Atorvastatin 80mg PO daily and Aspirin 81mg PO daily    Electrolyte Imbalances: WNL      GI ppx:                                   [] Not indicated   /   [X] Pantoprazole 40mg PO Daily    DVT ppx:  [] Not indicated / [] Heparin 5000mg SubQ / [X] Lovenox 40mg SubQ / [] SCDs    Fluids:   [X] PO  |  [] IVF    Activity:  [] Ad Li  /  [X] Increase as Tolerated  /  [] OOB w/ assist  /  [] Bed Rest    BMI:  Height (cm): 167.64 (01-09)  Weight (kg): 86.3 (01-09)  BMI (kg/m2): 30.7 (01-09)    DISPO:  Patient to be discharged when condition(s) optimized.  [X] Home  /  [] SNF  /  [] Long Term       [X] Discussion with patient and/or proxy regarding goals of care.  [X] Discussed Case and Plan with the Medical Attending.    CODE STATUS  [X] FULL   /    [] DNR    Please call Dr. Pabon [PGY-1] with any questions/consult recs: Spectra #[id number]

## 2019-01-10 NOTE — DISCHARGE NOTE ADULT - CARE PROVIDERS DIRECT ADDRESSES
[email or web address]

## 2019-01-10 NOTE — DISCHARGE NOTE ADULT - CARE PROVIDER_API CALL
Jimmy Leach; JEREMY), Cardiac Electrophysiology  [address]  [place] [address]  Phone: [phone]  Fax: [phone]    Erin Valencia), Internal Medicine  [address] [place] [address]  Phone: [phone]  Fax: [phone]    Jose Luis Benitez), Otolaryngology  [address] [place] [address]  [address] [place] [address]  Phone: [phone]  Fax: [phone]    Arnaldo Maurice), Neurology; Neuromuscular Medicine  [address] [place] [address]  [address]  [place], [place] [id number]  Phone: [phone]  Fax: [phone]

## 2019-01-10 NOTE — DISCHARGE NOTE ADULT - REASON FOR ADMISSION
Hypertensive emergency Hypertensive emergency / Hearing Loss and Vertigo likely Menier's Dz / Family History LQTI

## 2019-01-10 NOTE — DISCHARGE NOTE ADULT - HOSPITAL COURSE
This is a 58 year old male who presented with a cc/o dizzines x3 days. His symptoms are associated with nausea/vomiting and tinnitus. His onset of symptoms began while watching football, and admits to 3 glasses of wine at that time. He described his dizziness and "spinning", The following day, he felt unsteady on his feet. He went to an urgent care where he was found to have high blood pressure, and subsequently sent to the hospital. On presentation, his BP was 232/122, HR 90s. He was admitted with a working diagnosis of hypertensive emergency.    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 This is a 58 year old male who presented with a cc/o dizzines x3 days. His symptoms are associated with nausea/vomiting and tinnitus. His onset of symptoms began while watching football, and admits to 3 glasses of wine at that time. He described his dizziness and "spinning", The following day, he felt unsteady on his feet. He went to an urgent care where he was found to have high blood pressure, and subsequently sent to the hospital. On presentation, his BP was 232/122, HR 90s. He was admitted with a working diagnosis of hypertensive emergency.    PMHx: Dizziness/tinnitus; Gout; HTN; Long QT syndrome? This is a 58 year old male who presented with a cc/o dizzines x3 days. His symptoms are associated with nausea/vomiting and tinnitus. His onset of symptoms began while watching football, and admits to 3 glasses of wine at that time. He described his dizziness and "spinning", The following day, he felt unsteady on his feet. He went to an urgent care where he was found to have high blood pressure, and subsequently sent to the hospital. On presentation, his BP was 232/122, HR 90s. He was admitted with a working diagnosis of hypertensive emergency.    PMHx: Dizziness/tinnitus; Gout; HTN; Long QT syndrome?    Vital Signs Last 24 Hrs  T(C): 36.6 (10 Juan Luis 2019 13:26), Max: 36.6 (09 Jan 2019 21:00)  T(F): 97.8 (10 Juan Luis 2019 13:26), Max: 97.8 (09 Jan 2019 21:00)  HR: 73 (10 Juan Luis 2019 13:26) (70 - 101)  BP: 162/75 (10 Juan Luis 2019 13:26) (158/82 - 188/93)-  RR: 18 (10 Juan Luis 2019 13:26) (18 - 20)    PHYSICAL EXAM:  GENERAL: NAD, well-developed  HEAD:  Atraumatic, Normocephalic  EYES: EOMI, PERRLA, conjunctiva and sclera clear  NECK: Supple, No JVD  CHEST/LUNG: Clear to auscultation bilaterally; No wheeze  HEART: Regular rate and rhythm; No murmurs, rubs, or gallops  ABDOMEN: Soft, Nontender, Nondistended; Bowel sounds present  EXTREMITIES:  2+ Peripheral Pulses, No clubbing, cyanosis, or edema  PSYCH: AAOx3  NEUROLOGY: non-focal  SKIN: No rashes or lesions    Meds / Per med rec  F/U as per instructions  NO DRIVING UNTIL CLEARED BY OUPT DR. Mark is still vertiginous at discharge. BP much improved.  Spent close to 40minutes counselling patient about his condition and life style modification needed - wt loss, low sodium diet, 1 hour exercise 5 days a week and wt loss - abdominal fat evident - likely Metabolic Syndrome. This is a 58 year old male who presented with a cc/o dizzines x3 days. His symptoms are associated with nausea/vomiting and tinnitus. His onset of symptoms began while watching football, and admits to 3 glasses of wine at that time. He described his dizziness and "spinning", The following day, he felt unsteady on his feet. He went to an urgent care where he was found to have high blood pressure, and subsequently sent to the hospital. On presentation, his BP was 232/122, HR 90s. He was admitted with a working diagnosis of hypertensive emergency.    PMHx: Dizziness/tinnitus; Gout; HTN; Long QT syndrome?    Vital Signs Last 24 Hrs  T(C): 36.6 (10 Juan Luis 2019 13:26), Max: 36.6 (09 Jan 2019 21:00)  T(F): 97.8 (10 Juan Luis 2019 13:26), Max: 97.8 (09 Jan 2019 21:00)  HR: 73 (10 Juan Luis 2019 13:26) (70 - 101)  BP: 162/75 (10 Juan Luis 2019 13:26) (158/82 - 188/93)-  RR: 18 (10 Juan Luis 2019 13:26) (18 - 20)    PHYSICAL EXAM:  GENERAL: NAD, well-developed  HEAD:  Atraumatic, Normocephalic  EYES: EOMI, PERRLA, conjunctiva and sclera clear  NECK: Supple, No JVD  CHEST/LUNG: Clear to auscultation bilaterally; No wheeze  HEART: Regular rate and rhythm; No murmurs, rubs, or gallops  ABDOMEN: Soft, Nontender, Nondistended; Bowel sounds present  EXTREMITIES:  2+ Peripheral Pulses, No clubbing, cyanosis, or edema  PSYCH: AAOx3  NEUROLOGY: non-focal  SKIN: No rashes or lesions    Meds / Per med rec  F/U as per instructions  Pt counselled to go for Genetic Testing to r/o QT Syndrome as given family h/x and Father with sudden death at age 36. Siblings have been tested and are negative.   NO DRIVING UNTIL CLEARED BY SHANDA THOMAS Pt is still vertiginous at discharge. BP much improved.  Spent close to 40minutes counselling patient about his condition and life style modification needed - wt loss, low sodium diet, 1 hour exercise 5 days a week and wt loss - abdominal fat evident - likely Metabolic Syndrome.

## 2019-01-10 NOTE — DISCHARGE NOTE ADULT - ADDITIONAL INSTRUCTIONS
Please follow up with the following specialties after discharge:  1. Primary care; as soon as possible after discharge  2. Neurology  3. Electrophysiology (EPS); after QT syndrome tests are in or if you become symptomatic again  4. ENT
(4) excellent

## 2019-01-10 NOTE — DISCHARGE NOTE ADULT - CARE PLAN
Principal Discharge DX:	Hypertensive emergency  Goal:	Resolution with medical management  Secondary Diagnosis:	Dizziness  Goal:	Medical management  Secondary Diagnosis:	Long QT syndrome  Goal:	Medical management Principal Discharge DX:	Hypertensive emergency  Goal:	Resolution with medical management  Assessment and plan of treatment:	Your blood pressure upon arrival to the emergency department was found to be 232/122. Your blood pressure improved with the medications sent to your pharmacy. Please continue to take these medications until otherwise instructed by your primary care physician. Please record your blood pressure with a blood pressure machine regularly to learn your baseline, and to determine if your medications are too much or too little. If your blood pressure is low, or you feel symptomatic (dizzy/lightheaded), check your pressure. Do not take your medications if your systolic (top number) is <100 or if your diastolic (bottom number) is <60. You should try to follow up with your primary doctor within 1 week or less after discharge.  Secondary Diagnosis:	Dizziness  Goal:	Medical management  Assessment and plan of treatment:	You presented with a complaint of dizziness. This is a chronic complaint. Please follow up with neurology soon after discharge for further assessment and management. They recommended you follow up for vestibular rehabilitation while you were admitted. You should also follow up with ENT.  Secondary Diagnosis:	Long QT syndrome  Goal:	Medical management  Assessment and plan of treatment:	You have a history of prolonged QTc on EKG. Please follow up with Dr. Leach soon after discharge. You should ask your PMD to perform your perform your genetic testing for prolonged QT syndrome before following up with EPS (Dr. Leach).

## 2019-01-10 NOTE — DISCHARGE NOTE ADULT - OTHER SIGNIFICANT FINDINGS
CT Head No Cont  PROCEDURE DATE:  01/08/2019      INTERPRETATION:  Clinical History / Reason for exam: Dizziness    Comparison: None    Findings:    The ventricles, basal cisterns and sulcal pattern are within normal limits for the patient's stated age.      Grey-white differentiation is preserved.    There is no acute mass effect, midline shift or intracranial hemorrhage.      The calvarium is intact.    The visualized paranasal sinuses and bilateral mastoid complexes are unremarkable.     Impression:   ·	No CT evidence for acute intracranial pathology.

## 2019-01-10 NOTE — PROGRESS NOTE ADULT - SUBJECTIVE AND OBJECTIVE BOX
DAILY PROGRESS NOTE  ===========================================================    Patient Information:  HAYES VENCES  /  58y  /  Male  /  MRN#: [id number]    Hospital Day: 2d     |:::::::::::::::::::::::::::| SUBJECTIVE |:::::::::::::::::::::::::::|    OVERNIGHT EVENTS:   TODAY: Patient was seen today at bedside. Review of systems is otherwise negative.    |:::::::::::::::::::::::::::| OBJECTIVE |:::::::::::::::::::::::::::|    VITAL SIGNS: Last 24 Hours      PHYSICAL EXAM:  GENERAL:   Awake, alert; NAD.  HEENT:  Head NC/AT; Conjunctivae pink, Sclera anicteric & non-injected; Oral mucosa moist.  NECK:   Supple.  CARDIO:   Regular rate; Regular rhythm; S1 & S2; No M/R/G appreciated.  RESP:   Equal expansion; Good entry BL; No rales or rhonchi appreciated.  GI:   Soft; NT/ND; BS; No guarding; No rebound tenderness.  EXT:   UE and LE tone intact; UE and LE strength 5/5; No edema in UE and LE.  NEURO:   PERRL; EOMI; CNII-XII grossly intact.  SKIN:   Intact.    LAB RESULTS:  AM labs pending    MICROBIOLOGY:  None to report    RADIOLOGY:  CT Head No Cont (01.08.19 @ 17:02)    Impression:   No evidence for acute intracranial pathology.    ALLERGIES:  No Known Allergies    HOME MEDICATIONS:    =========================================================== DAILY PROGRESS NOTE  ===========================================================    Patient Information:  HAYES VENCES  /  58y  /  Male  /  MRN#: [id number]    Hospital Day: 2d     |:::::::::::::::::::::::::::| SUBJECTIVE |:::::::::::::::::::::::::::|    OVERNIGHT EVENTS:   TODAY: Patient was seen today at bedside. He complains of some nausea and decreased appetite. Review of systems is otherwise negative.    |:::::::::::::::::::::::::::| OBJECTIVE |:::::::::::::::::::::::::::|    VITAL SIGNS: Last 24 Hours  T(F): 96.7 (10 Juan Luis 2019 05:41), Max: 97.9 (09 Jan 2019 15:39)  HR: 81 (10 Juan Luis 2019 05:41) (81 - 101)  BP: 161/85 (10 Juan Luis 2019 05:41) (161/85 - 207/102)  RR: 18 (10 Juan Luis 2019 05:41) (18 - 20)  SpO2: 98% (09 Jan 2019 15:39) (98% - 98%)    PHYSICAL EXAM:  GENERAL:   Awake, alert; NAD.  HEENT:  Head NC/AT; Conjunctivae pink, Sclera anicteric; Oral mucosa moist.  NECK:   Supple.  CARDIO:   Regular rate; Regular rhythm; S1 & S2;.  RESP:   No rales or rhonchi appreciated; decreased effort.  GI:   Soft; NT/ND; BS; No guarding; No rebound tenderness; umbilical hernia noted without pain.  EXT:   Trace edema in LE BL.  NEURO:   PERRL; EOM.  SKIN:   Intact.    LAB RESULTS:  AM labs pending results; will follow up.    MICROBIOLOGY:  None to report    RADIOLOGY:  CT Head No Cont (01.08.19 @ 17:02)    Impression:   No evidence for acute intracranial pathology.    ALLERGIES:  No Known Allergies    HOME MEDICATIONS:    =========================================================== DAILY PROGRESS NOTE  ===========================================================    Patient Information:  HAYES VENCES  /  58y  /  Male  /  MRN#: [id number]    Hospital Day: 2d     |:::::::::::::::::::::::::::| SUBJECTIVE |:::::::::::::::::::::::::::|    OVERNIGHT EVENTS:   TODAY: Patient was seen today at bedside. He complains of some nausea and decreased appetite. Review of systems is otherwise negative.    |:::::::::::::::::::::::::::| OBJECTIVE |:::::::::::::::::::::::::::|    VITAL SIGNS: Last 24 Hours  T(F): 96.7 (10 Juan Luis 2019 05:41), Max: 97.9 (09 Jan 2019 15:39)  HR: 81 (10 Juan Luis 2019 05:41) (81 - 101)  BP: 161/85 (10 Juan Luis 2019 05:41) (161/85 - 207/102)  RR: 18 (10 Juan Luis 2019 05:41) (18 - 20)  SpO2: 98% (09 Jan 2019 15:39) (98% - 98%)    PHYSICAL EXAM:  GENERAL:   Awake, alert; NAD.  HEENT:  Head NC/AT; Conjunctivae pink, Sclera anicteric; Oral mucosa moist.  NECK:   Supple.  CARDIO:   Regular rate; Regular rhythm; S1 & S2;.  RESP:   No rales or rhonchi appreciated; decreased effort.  GI:   Soft; NT/ND; BS; No guarding; No rebound tenderness; umbilical hernia noted without pain.  EXT:   Trace edema in LE BL.  NEURO:   PERRL; EOM.  SKIN:   Intact.    LAB RESULTS:  01-10-19 @ 08:42    142  |  96<L>  |  23<H>             --------------------------< 123<H>     4.0  |  30  | 1.3    eGFR AA: 70  eGFR N-AA: 60    Calcium: 9.5  Phosphorus: --  Magnesium: 2.4    MICROBIOLOGY:  None to report    RADIOLOGY:  CT Head No Cont (01.08.19 @ 17:02)    Impression:   No evidence for acute intracranial pathology.    ALLERGIES:  No Known Allergies    HOME MEDICATIONS:    ===========================================================

## 2019-01-14 DIAGNOSIS — I16.1 HYPERTENSIVE EMERGENCY: ICD-10-CM

## 2019-01-14 DIAGNOSIS — I10 ESSENTIAL (PRIMARY) HYPERTENSION: ICD-10-CM

## 2019-01-14 DIAGNOSIS — H81.02 MENIERE'S DISEASE, LEFT EAR: ICD-10-CM

## 2019-01-14 DIAGNOSIS — I45.81 LONG QT SYNDROME: ICD-10-CM

## 2019-01-14 DIAGNOSIS — Z82.49 FAMILY HISTORY OF ISCHEMIC HEART DISEASE AND OTHER DISEASES OF THE CIRCULATORY SYSTEM: ICD-10-CM

## 2019-01-14 DIAGNOSIS — E88.81 METABOLIC SYNDROME AND OTHER INSULIN RESISTANCE: ICD-10-CM

## 2019-01-14 DIAGNOSIS — R42 DIZZINESS AND GIDDINESS: ICD-10-CM

## 2019-01-14 DIAGNOSIS — M10.9 GOUT, UNSPECIFIED: ICD-10-CM

## 2020-01-25 ENCOUNTER — EMERGENCY (EMERGENCY)
Facility: HOSPITAL | Age: 60
LOS: 0 days | Discharge: HOME | End: 2020-01-25
Attending: EMERGENCY MEDICINE | Admitting: EMERGENCY MEDICINE
Payer: OTHER MISCELLANEOUS

## 2020-01-25 VITALS
TEMPERATURE: 98 F | OXYGEN SATURATION: 100 % | SYSTOLIC BLOOD PRESSURE: 171 MMHG | DIASTOLIC BLOOD PRESSURE: 81 MMHG | RESPIRATION RATE: 18 BRPM | HEART RATE: 83 BPM

## 2020-01-25 VITALS — WEIGHT: 195.11 LBS
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
## 2020-01-25 DIAGNOSIS — Z87.891 PERSONAL HISTORY OF NICOTINE DEPENDENCE: ICD-10-CM
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
## 2020-01-25 DIAGNOSIS — S52.502A UNSPECIFIED FRACTURE OF THE LOWER END OF LEFT RADIUS, INITIAL ENCOUNTER FOR CLOSED FRACTURE: ICD-10-CM
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
## 2020-01-25 DIAGNOSIS — S52.602A UNSPECIFIED FRACTURE OF LOWER END OF LEFT ULNA, INITIAL ENCOUNTER FOR CLOSED FRACTURE: ICD-10-CM

## 2020-01-25 DIAGNOSIS — S69.90XA UNSPECIFIED INJURY OF UNSPECIFIED WRIST, HAND AND FINGER(S), INITIAL ENCOUNTER: ICD-10-CM
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
## 2020-01-25 PROBLEM — M10.9 GOUT, UNSPECIFIED: Chronic | Status: ACTIVE | Noted: 2019-01-08

## 2020-01-25 PROBLEM — R42 DIZZINESS AND GIDDINESS: Chronic | Status: ACTIVE | Noted: 2019-01-08

## 2020-01-25 PROCEDURE — 25605 CLTX DST RDL FX/EPHYS SEP W/: CPT | Mod: 54

## 2020-01-25 PROCEDURE — 99284 EMERGENCY DEPT VISIT MOD MDM: CPT | Mod: 57

## 2020-01-25 PROCEDURE — 73110 X-RAY EXAM OF WRIST: CPT | Mod: 26,77,LT

## 2020-01-25 PROCEDURE — 73110 X-RAY EXAM OF WRIST: CPT | Mod: 26,LT

## 2020-01-25 PROCEDURE — 73090 X-RAY EXAM OF FOREARM: CPT | Mod: 26,LT

## 2020-01-25 PROCEDURE — 73130 X-RAY EXAM OF HAND: CPT | Mod: 26,LT

## 2020-01-25 RX ORDER — CHLORTHALIDONE 50 MG
0 TABLET ORAL
Qty: 0 | Refills: 0 | DISCHARGE

## 2020-01-25 RX ORDER — OXYCODONE AND ACETAMINOPHEN 5; 325 MG/1; MG/1
1 TABLET ORAL ONCE
Refills: 0 | Status: DISCONTINUED | OUTPATIENT
Start: 2020-01-25 | End: 2020-01-25

## 2020-01-25 RX ORDER — HYDROCHLOROTHIAZIDE 25 MG
0 TABLET ORAL
Qty: 0 | Refills: 0 | DISCHARGE

## 2020-01-25 RX ADMIN — OXYCODONE AND ACETAMINOPHEN 1 TABLET(S): 5; 325 TABLET ORAL at 14:01

## 2020-01-25 NOTE — ED PROVIDER NOTE - NS ED ROS FT
Constitutional: (-) fever  Cardiovascular: (-) chest pain, (-) syncope  Respiratory: (-) cough, (-) shortness of breath  Musculoskeletal: (+) joint pain, (-) neck pain, (-) back pain  Integumentary: (+) swelling, (-) abrasion  Neurological: (-) weakness, (-) numbness  Allergic/Immunologic: (-) pruritus

## 2020-01-25 NOTE — ED PROVIDER NOTE - NSFOLLOWUPINSTRUCTIONS_ED_ALL_ED_FT
Follow up with PMD and Orthopedics in 1-2 days.    Fracture    A fracture is a break in one of your bones. This can occur from a variety of injuries, especially traumatic ones. Symptoms include pain, bruising, or swelling. Do not use the injured limb. If a fracture is in one of the bones below your waist, do not put weight on that limb unless instructed to do so by your healthcare provider. Crutches or a cane may have been provided. A splint or cast may have been applied by your health care provider. Make sure to keep it dry and follow up with an orthopedist as instructed.    SEEK IMMEDIATE MEDICAL CARE IF YOU HAVE ANY OF THE FOLLOWING SYMPTOMS: numbness, tingling, increasing pain, or weakness in any part of the injured limb.

## 2020-01-25 NOTE — ED PROVIDER NOTE - PHYSICAL EXAMINATION
CONST: NAD  NECK: Non-tender, no meningeal signs. normal ROM. supple   CARD: S1 S2; No jvd  RESP: Equal BS B/L, No wheezes, rhonchi or rales. No distress  MS: Edema tenderness to L wrist. L UE ROM intact, n/v intact  SKIN: Warm, dry, no abrasion or laceration.   NEURO: No focal deficits. Strength 5/5 with no sensory deficits.

## 2020-01-25 NOTE — ED PROVIDER NOTE - CLINICAL SUMMARY MEDICAL DECISION MAKING FREE TEXT BOX
closed displaced distal radioulnar fracture, NVI, reduced and splinted, NVI, for ortho f/u. Patient counseled regarding conditions which should prompt return.

## 2020-01-25 NOTE — ED PROVIDER NOTE - CARE PROVIDER_API CALL
Zach Reynoso (MD)  Orthopaedic Surgery  [address] [place] [address]  Phone: [phone]  Fax: [phone]  Follow Up Time:

## 2020-01-25 NOTE — ED PROVIDER NOTE - NSFOLLOWUPCLINICS_GEN_ALL_ED_FT
[facility]  Orthpedic  [address], [place]   Phone: [phone]  Fax:   Follow Up Time:

## 2020-01-25 NOTE — ED PROVIDER NOTE - PATIENT PORTAL LINK FT
You can access the FollowMyHealth Patient Portal offered by [facility] by registering at the following website: [email or web address]. By joining [facility]’s FollowMyHealth portal, you will also be able to view your health information using other applications (apps) compatible with our system.

## 2020-01-25 NOTE — ED PROVIDER NOTE - OBJECTIVE STATEMENT
59y M pmh htn, hld presents for eval of L wrist pain. Pt states he was in a vehicle when he put his arm out the window to open a gait while the vehicle was moving and his wrist was hyperextended x1hr ago. Now presents with sever L wrist pain, worse with wrist flexion, no relieving factors. Associated swelling. Denies numbness, weakness, discoloration

## 2023-02-09 ENCOUNTER — NON-APPOINTMENT (OUTPATIENT)
Age: 63
End: 2023-02-09

## 2023-02-09 ENCOUNTER — APPOINTMENT (OUTPATIENT)
Dept: ORTHOPEDIC SURGERY | Facility: CLINIC | Age: 63
End: 2023-02-09
Payer: COMMERCIAL

## 2023-02-09 VITALS — BODY MASS INDEX: 31.34 KG/M2 | WEIGHT: 195 LBS | HEIGHT: 66 IN

## 2023-02-09 DIAGNOSIS — M16.11 UNILATERAL PRIMARY OSTEOARTHRITIS, RIGHT HIP: ICD-10-CM

## 2023-02-09 PROBLEM — Z00.00 ENCOUNTER FOR PREVENTIVE HEALTH EXAMINATION: Status: ACTIVE | Noted: 2023-02-09

## 2023-02-09 PROCEDURE — 73503 X-RAY EXAM HIP UNI 4/> VIEWS: CPT | Mod: RT

## 2023-02-09 PROCEDURE — 72110 X-RAY EXAM L-2 SPINE 4/>VWS: CPT

## 2023-02-09 PROCEDURE — 99203 OFFICE O/P NEW LOW 30 MIN: CPT

## 2023-02-09 NOTE — IMAGING
[de-identified] : On examination of the right leg, he has fairly good motion through internal/external rotation of the right hip.  Denies any groin pain.  Good range of motion to the left hip.  Pain with straight leg raise on the right, negative straight leg raise on the left.  Points to the right gluteal region tenderness there and in the right paraspinal muscle.  No tenderness of the lumbar vertebra.  States the pain moves into his right leg above the knee, it is in different areas at times.  He has good motion to the right knee.  Calf is soft and nontender.\par \par X-ray pelvis and right hip no obvious fracture or dislocation, arthritic changes noted bilateral hips, heterotopic ossification seen over the greater trochanter bilaterally\par X-ray lumbar spine to multilevel degenerative change no evidence of acute bony abnormality

## 2023-02-09 NOTE — HISTORY OF PRESENT ILLNESS
[de-identified] : 62-year-old male comes in today for evaluation of his right-sided back pain radiating into the right leg.  No specific trauma.  Patient states that Monday he woke up with some tightness in his right leg.  He has pain getting in and out of his car which is low.  He states he works for the [employer].  He has been taking Advil.

## 2023-02-09 NOTE — ASSESSMENT
[FreeTextEntry1] : Today we discussed x-ray findings, he does have pathology in both the hip and in the lumbar spine, he denies any groin pain actively, we discussed that the discomfort that he is currently experiencing may be coming from his lower back.  Recommending heat, I will send prescription anti-inflammatory and 1 week supply of tramadol to the pharmacy. Physical therapy prescription was provided.  I recommended a follow-up in a few weeks so we can check his progress.\par \par This patient was seen under the supervision of Dr. Reynoso.\par

## 2023-02-22 ENCOUNTER — APPOINTMENT (OUTPATIENT)
Dept: ORTHOPEDIC SURGERY | Facility: CLINIC | Age: 63
End: 2023-02-22
Payer: COMMERCIAL

## 2023-02-22 PROCEDURE — 99214 OFFICE O/P EST MOD 30 MIN: CPT

## 2023-02-22 NOTE — HISTORY OF PRESENT ILLNESS
[de-identified] : 62-year-old male presents with about 4 weeks of low back pain that radiates through his thigh.  He has had this kind of pain off and on for many years now.  Is currently in physical therapy.  He is taking anti-inflammatories and tramadol.  Denies loss of bladder or bowel.  He has not had any injections.  He has an x-ray of his lumbar spine.

## 2023-02-22 NOTE — DATA REVIEWED
[FreeTextEntry1] : I reviewed the patient's AP lateral flexion-extension lumbar x-rays done on February 9, 2023.  Those demonstrated degenerative disc disease.  And facet arthropathy.  No instability.

## 2023-02-22 NOTE — PHYSICAL EXAM
[de-identified] : TTP midline spine and paraspinal musculature \par Strength                                         \par Hip flexor\par   Right: 5/5; Left: 5/5                             \par Knee extensor  \par   Right: 5/5; Left: 5/5                     \par Ankle dorsiflexion\par   Right: 5/5; Left: 5/5                  \par EHL        \par   Right: 5/5; Left: 5/5                                \par Ankle plantarflexion    \par   Right: 5/5; Left: 5/5\par \par Sensation\par L1\par   Right: 2/2; Left: 2/2\par L2\par   Right: 2/2; Left: 2/2\par L3\par   Right: 2/2; Left: 2/2\par L4\par   Right: 2/2; Left: 2/2\par L5\par   Right: 2/2; Left: 2/2\par S1\par   Right: 2/2; Left: 2/2\par \par Reflexes\par Patella\par   Right: 2+; Left 2+\par Achilles\par   Right: 2+; Left 2+\par Clonus\par  Right: absent; L: absent\par

## 2023-02-22 NOTE — DISCUSSION/SUMMARY
[de-identified] : 60-year-old male presents to me with lumbar radiculopathy going on for about 4 weeks.  The patient is currently in physical therapy.  He is taking anti-inflammatories.  None of this is helping.  I recommend that he follow-up with Dr. Siegel to discuss injections.  I will see the patient back in 3 months.

## 2023-03-06 ENCOUNTER — APPOINTMENT (OUTPATIENT)
Dept: PAIN MANAGEMENT | Facility: CLINIC | Age: 63
End: 2023-03-06
Payer: COMMERCIAL

## 2023-03-06 VITALS
SYSTOLIC BLOOD PRESSURE: 175 MMHG | WEIGHT: 195 LBS | HEART RATE: 83 BPM | BODY MASS INDEX: 31.34 KG/M2 | DIASTOLIC BLOOD PRESSURE: 80 MMHG | HEIGHT: 66 IN

## 2023-03-06 PROCEDURE — 99204 OFFICE O/P NEW MOD 45 MIN: CPT

## 2023-03-06 NOTE — PHYSICAL EXAM
[de-identified] : GENERAL APPEARANCE OF PATIENT IS WELL DEVELOPED, WELL NOURISHED, BODY HABITUS NORMAL, WELL GROOMED, NO DEFORMITIES NOTED. \par Head - Atraumatic and Normocephalic \par Eyes, Nose, and Throat: External inspection of ears and nose are normal overall without scars, lesions, or masses noted. Assessment of hearing is normal\par Neck-Examination of neck shows no masses, overall appearance is normal, neck is symmetric, tracheal position is midline, no crepitus is noted. Examination of thyroid shows no enlargement, tenderness or masses\par Respiratory- Assessment of respiratory effort shows no intercostal retractions, no use of accessory muscles, unlabored breathing, and normal diaphragmatic movement.\par Cardiovascular- Examination of extremities show no edema or varicosities\par Musculoskeletal. Examination of gait is not antalgic and station is normal\par Inspection and palpation of digits and nails shows no clubbing, cyanosis, nodules, drainage, fluctuance, petechiae\par \par • Spine – 80-85 degrees in flexion with pain. \par \par \par • Neck- inspection and palpation shows no misalignment, asymmetry, crepitation, defects, tenderness, masses, effusions. ROM is normal without crepitation or contracture. No instability or subluxation or laxity is noted. No abnormal movements.\par \par \par • RUE- inspection and palpation shows no misalignment, asymmetry, crepitation, defects, tenderness, masses, effusions. ROM is normal without crepitation or contracture. No instability or subluxation or laxity is noted. No abnormal movements.\par \par \par • LUE- inspection and palpation shows no misalignment, asymmetry, crepitation, defects, tenderness, masses, effusions. ROM is normal without crepitation or contracture. No instability or subluxation or laxity is noted. No abnormal movements.\par \par \par • RLE- inspection and palpation shows no misalignment, asymmetry, crepitation, defects, tenderness, masses, effusions. ROM is normal without crepitation or contracture. No instability or subluxation or laxity is noted. No abnormal movements.\par \par \par • LLE- inspection and palpation shows no misalignment, asymmetry, crepitation, defects, tenderness, masses, effusions. ROM is normal without crepitation or contracture. No instability or subluxation or laxity is noted. No abnormal movements.\par \par \par • Skin- Inspection of skin and subcutaneous tissue shows no rashes, lesions or ulcers. Palpation of skin and subcutaneous tissue shows no rashes, no indurations, subcutaneous nodules or tightening.\par \par \par • Abdomen- Nontender\par \par \par • Neurologic- CN 2-12 are grossly intact. No sensory or motor deficits in the upper and lower extremities. Adequate strength in upper and lower extremities \par \par \par • Psychiatric- Patient’s judgment and insight are intact. Oriented to time, place and person. Recent and remote memory intact.\par

## 2023-03-06 NOTE — ASSESSMENT
[FreeTextEntry1] : Mr. Ike Daniel is a 62 year old male with a chief complaint of lower back pain which refers into the right lower extremity. He notes pain is mainly in the front of the thigh and knee. He has had this pain for about 6 weeks following no precipitating injury or event. He had a lumbar x-ray which was reviewed in detail during today's encounter. He has trialed 2 sessions of physical therapy with no relief. He will continue physical therapy at this time. A lumbar MRI was ordered to delineate spine pathology such as disc displacement and stenosis. Patient will follow up in about 4 weeks to review imaging results and discuss further treatment. \par \par I, Nicole Ordonez, attest that this documentation has been prepared under the direction and in the presence of Provider Nery Villalpando The documentation recorded by the scribe, in my presence, accurately reflects the service I personally performed, and the decisions made by me with my edits as appropriate.\par \par Best Regards, \par Doe Siegel, D.O. \par Diplomat, American Board of Anesthesiology \par Diplomat, American Board of Pain Medicine \par Diplomat, American Board of Pain Management

## 2023-03-06 NOTE — HISTORY OF PRESENT ILLNESS
[FreeTextEntry1] : HISTORY OF PRESENT ILLNESS: This is a 62 year old man complaining of lower back pain.  He notes pain refers into the right lower extremity. The patient has had this pain for 6 weeks. The pain started after no specific injury or event. Patient describes pain as moderate to severe. During the last month the pain has been nearly constant with symptoms worsening in the afternoon and evening. Pain described as cramping, throbbing, and shooting. Pain is associated with numbness and pins and needles into the left foot. Patient has weakness in the lower extremity. Pain is increased with lying down, sitting, relaxation, and bowel movements.  Pain is decreased with standing and walking. Bowel or bladder habits have not changed.\par  \par ACTIVITIES: Patient could walk many blocks before the pain starts. Patient can sit 10 hours  before pain starts. Patient can stand 2-3 hours before pain starts. The patient [ never, seldom, sometimes, often or constantly] lays down because of pain. Patient uses no assistive walking device at this time. Patient has difficulty participating in recreational activities at this time.\par \par PRIOR PAIN TREATMENTS:  No relief with exercise, heat treatment and cold treatment\par \par PRIOR PAIN MEDICATIONS:  Tylenol, aspirin and Codeine\par  \par Covid 19 - This in-office encounter has occurred during a Public Health Emergency (PHE). As required by law, due to the risk of respiratory-transmitted infectious diseases, our office provided additional materials, supplies and clinical staff to assist in keeping our patients, physicians and office staff safe during this health emergency.

## 2023-03-27 ENCOUNTER — APPOINTMENT (OUTPATIENT)
Dept: MRI IMAGING | Facility: CLINIC | Age: 63
End: 2023-03-27
Payer: COMMERCIAL

## 2023-03-27 PROCEDURE — 72148 MRI LUMBAR SPINE W/O DYE: CPT

## 2023-04-03 ENCOUNTER — APPOINTMENT (OUTPATIENT)
Dept: PAIN MANAGEMENT | Facility: CLINIC | Age: 63
End: 2023-04-03
Payer: COMMERCIAL

## 2023-04-03 VITALS — HEIGHT: 66 IN | WEIGHT: 195 LBS | BODY MASS INDEX: 31.34 KG/M2

## 2023-04-03 DIAGNOSIS — M54.16 RADICULOPATHY, LUMBAR REGION: ICD-10-CM

## 2023-04-03 DIAGNOSIS — M47.816 SPONDYLOSIS W/OUT MYELOPATHY OR RADICULOPATHY, LUMBAR REGION: ICD-10-CM

## 2023-04-03 DIAGNOSIS — G89.29 OTHER CHRONIC PAIN: ICD-10-CM

## 2023-04-03 DIAGNOSIS — M51.26 OTHER INTERVERTEBRAL DISC DISPLACEMENT, LUMBAR REGION: ICD-10-CM

## 2023-04-03 DIAGNOSIS — M51.16 INTERVERTEBRAL DISC DISORDERS WITH RADICULOPATHY, LUMBAR REGION: ICD-10-CM

## 2023-04-03 PROCEDURE — 80305 DRUG TEST PRSMV DIR OPT OBS: CPT | Mod: QW

## 2023-04-03 PROCEDURE — 99213 OFFICE O/P EST LOW 20 MIN: CPT

## 2023-04-03 RX ORDER — MELOXICAM 15 MG/1
15 TABLET ORAL
Qty: 30 | Refills: 0 | Status: ACTIVE | COMMUNITY
Start: 2023-02-09 | End: 1900-01-01

## 2023-04-03 RX ORDER — TRAMADOL HYDROCHLORIDE 50 MG/1
50 TABLET, COATED ORAL
Qty: 14 | Refills: 0 | Status: ACTIVE | COMMUNITY
Start: 2023-02-09 | End: 1900-01-01

## 2023-04-06 NOTE — HISTORY OF PRESENT ILLNESS
[FreeTextEntry1] : HISTORY OF PRESENT ILLNESS: This is a 62 year old man complaining of lower back pain.  He notes pain refers into the right lower extremity. The patient has had this pain for 6 weeks. The pain started after no specific injury or event. Patient describes pain as moderate to severe. During the last month the pain has been nearly constant with symptoms worsening in the afternoon and evening. Pain described as cramping, throbbing, and shooting. Pain is associated with numbness and pins and needles into the left foot. Patient has weakness in the lower extremity. Pain is increased with lying down, sitting, relaxation, and bowel movements.  Pain is decreased with standing and walking. Bowel or bladder habits have not changed.\par  \par ACTIVITIES: Patient could walk many blocks before the pain starts. Patient can sit 10 hours  before pain starts. Patient can stand 2-3 hours before pain starts. The patient [ never, seldom, sometimes, often or constantly] lays down because of pain. Patient uses no assistive walking device at this time. Patient has difficulty participating in recreational activities at this time.\par \par PRIOR PAIN TREATMENTS:  No relief with exercise, heat treatment and cold treatment\par \par PRIOR PAIN MEDICATIONS:  Tylenol, aspirin and Codeine\par \par PRESENTING TODAY: In revisit encounter in regard to his continued lower back pain. He notes pain has improved by about 50% with physical therapy. Pain continues to refer into the right lower extremity. He had a lumbar MRI which was reviewed in detail during today's encounter. \par \par Covid 19 - This in-office encounter has occurred during a Public Health Emergency (PHE). As required by law, due to the risk of respiratory-transmitted infectious diseases, our office provided additional materials, supplies and clinical staff to assist in keeping our patients, physicians and office staff safe during this health emergency.

## 2023-04-06 NOTE — PHYSICAL EXAM
[de-identified] : GENERAL APPEARANCE OF PATIENT IS WELL DEVELOPED, WELL NOURISHED, BODY HABITUS NORMAL, WELL GROOMED, NO DEFORMITIES NOTED. \par Head - Atraumatic and Normocephalic \par Eyes, Nose, and Throat: External inspection of ears and nose are normal overall without scars, lesions, or masses noted. Assessment of hearing is normal\par Neck-Examination of neck shows no masses, overall appearance is normal, neck is symmetric, tracheal position is midline, no crepitus is noted. Examination of thyroid shows no enlargement, tenderness or masses\par Respiratory- Assessment of respiratory effort shows no intercostal retractions, no use of accessory muscles, unlabored breathing, and normal diaphragmatic movement.\par Cardiovascular- Examination of extremities show no edema or varicosities\par Musculoskeletal. Examination of gait is not antalgic and station is normal\par Inspection and palpation of digits and nails shows no clubbing, cyanosis, nodules, drainage, fluctuance, petechiae\par \par • Spine – 80-85 degrees in flexion with pain. \par \par \par • Neck- inspection and palpation shows no misalignment, asymmetry, crepitation, defects, tenderness, masses, effusions. ROM is normal without crepitation or contracture. No instability or subluxation or laxity is noted. No abnormal movements.\par \par \par • RUE- inspection and palpation shows no misalignment, asymmetry, crepitation, defects, tenderness, masses, effusions. ROM is normal without crepitation or contracture. No instability or subluxation or laxity is noted. No abnormal movements.\par \par \par • LUE- inspection and palpation shows no misalignment, asymmetry, crepitation, defects, tenderness, masses, effusions. ROM is normal without crepitation or contracture. No instability or subluxation or laxity is noted. No abnormal movements.\par \par \par • RLE- inspection and palpation shows no misalignment, asymmetry, crepitation, defects, tenderness, masses, effusions. ROM is normal without crepitation or contracture. No instability or subluxation or laxity is noted. No abnormal movements.\par \par \par • LLE- inspection and palpation shows no misalignment, asymmetry, crepitation, defects, tenderness, masses, effusions. ROM is normal without crepitation or contracture. No instability or subluxation or laxity is noted. No abnormal movements.\par \par \par • Skin- Inspection of skin and subcutaneous tissue shows no rashes, lesions or ulcers. Palpation of skin and subcutaneous tissue shows no rashes, no indurations, subcutaneous nodules or tightening.\par \par \par • Abdomen- Nontender\par \par \par • Neurologic- CN 2-12 are grossly intact. No sensory or motor deficits in the upper and lower extremities. Adequate strength in upper and lower extremities \par \par \par • Psychiatric- Patient’s judgment and insight are intact. Oriented to time, place and person. Recent and remote memory intact.\par

## 2023-05-15 ENCOUNTER — APPOINTMENT (OUTPATIENT)
Dept: PAIN MANAGEMENT | Facility: CLINIC | Age: 63
End: 2023-05-15

## 2023-05-22 ENCOUNTER — APPOINTMENT (OUTPATIENT)
Dept: ORTHOPEDIC SURGERY | Facility: CLINIC | Age: 63
End: 2023-05-22

## 2024-05-03 NOTE — PATIENT PROFILE ADULT - NSPROGENDIFFINTUB_GEN_A_NUR
Refill Request     CONFIRM preferred pharmacy with the patient.    If Mail Order Rx - Pend for 90 day refill.      Last Seen: Last Seen Department: 3/19/2024  Last Seen by PCP: 3/19/2024    Last Written: 9/8/23 90 tab 1 refills    If no future appointment scheduled:  Review the last OV with PCP and review information for follow-up visit,  Route STAFF MESSAGE with patient name to the  Pool for scheduling with the following information:            -  Timing of next visit           -  Visit type ie Physical, OV, etc           -  Diagnoses/Reason ie. COPD, HTN - Do not use MEDICATION, Follow-up or CHECK UP - Give reason for visit      Next Appointment:   Future Appointments   Date Time Provider Department Center   6/7/2024  8:30 AM Junie Grace PA [facility]  [place] [facility]       Message sent to  to schedule appt with patient?  NO      Requested Prescriptions     Pending Prescriptions Disp Refills    hydroCHLOROthiazide (HYDRODIURIL) 25 MG tablet 90 tablet 1     Sig: Take 1 tablet by mouth every morning       
never intubated